# Patient Record
Sex: MALE | Race: OTHER | HISPANIC OR LATINO
[De-identification: names, ages, dates, MRNs, and addresses within clinical notes are randomized per-mention and may not be internally consistent; named-entity substitution may affect disease eponyms.]

---

## 2019-12-02 ENCOUNTER — TRANSCRIPTION ENCOUNTER (OUTPATIENT)
Age: 64
End: 2019-12-02

## 2023-05-16 PROBLEM — Z00.00 ENCOUNTER FOR PREVENTIVE HEALTH EXAMINATION: Status: ACTIVE | Noted: 2023-05-16

## 2023-05-18 ENCOUNTER — OUTPATIENT (OUTPATIENT)
Dept: OUTPATIENT SERVICES | Facility: HOSPITAL | Age: 68
LOS: 1 days | End: 2023-05-18

## 2023-05-18 DIAGNOSIS — Z00.8 ENCOUNTER FOR OTHER GENERAL EXAMINATION: ICD-10-CM

## 2023-05-22 ENCOUNTER — APPOINTMENT (OUTPATIENT)
Dept: OTOLARYNGOLOGY | Facility: CLINIC | Age: 68
End: 2023-05-22
Payer: MEDICARE

## 2023-05-22 VITALS
HEIGHT: 72 IN | HEART RATE: 67 BPM | WEIGHT: 210 LBS | RESPIRATION RATE: 16 BRPM | DIASTOLIC BLOOD PRESSURE: 107 MMHG | BODY MASS INDEX: 28.44 KG/M2 | OXYGEN SATURATION: 98 % | SYSTOLIC BLOOD PRESSURE: 198 MMHG

## 2023-05-22 PROCEDURE — 99024 POSTOP FOLLOW-UP VISIT: CPT

## 2023-05-22 PROCEDURE — 31575 DIAGNOSTIC LARYNGOSCOPY: CPT

## 2023-05-22 RX ORDER — AMLODIPINE BESYLATE 5 MG/1
TABLET ORAL
Refills: 0 | Status: ACTIVE | COMMUNITY

## 2023-05-22 NOTE — CONSULT LETTER
[Dear  ___] : Dear  [unfilled], [Consult Letter:] : I had the pleasure of evaluating your patient, [unfilled]. [Please see my note below.] : Please see my note below. [Consult Closing:] : Thank you very much for allowing me to participate in the care of this patient.  If you have any questions, please do not hesitate to contact me. [Sincerely,] : Sincerely, [FreeTextEntry2] : Dr. Ramesh Zhang\par [address] [facility], [address]\[place] [address] [FreeTextEntry3] : Dev

## 2023-05-22 NOTE — HISTORY OF PRESENT ILLNESS
[de-identified] : Pt is smoker and  is referred by Dr. Zhang for right vocal cord lesion. pt c.o hoarseness for one month. CT of neck on  5/5/2023 asymmetric thicken right true vocal cord fold.  pt c.o sudden on set of hoarsen about 7 weeks ago, severely hoarse in past 3 weeks.  no over use of voice, no URI. no dysphagia, mild coughing, no blood in sputum or saliva. no wt loss. \par \par smoker 2-3 cig.day recently, before 5-6/day for 50 yrs. \par no etoh\par  \par upload image here

## 2023-05-22 NOTE — PROCEDURE
[Lesion] : lesion identified by mirror examination needing further evaluation [None] : none [Flexible Endoscope] : examined with the flexible endoscope [Serial Number: ___] : Serial Number: [unfilled] [de-identified] : No lesions in the NPx, OPx, HPx.  Exam of the larynx with mass involving the R. VC which is paralyzed, L. VC is mobile, patient is intubatable.\par

## 2023-05-25 ENCOUNTER — OUTPATIENT (OUTPATIENT)
Dept: OUTPATIENT SERVICES | Facility: HOSPITAL | Age: 68
LOS: 1 days | End: 2023-05-25
Payer: MEDICARE

## 2023-05-25 VITALS
WEIGHT: 205.03 LBS | HEART RATE: 70 BPM | HEIGHT: 72 IN | OXYGEN SATURATION: 98 % | SYSTOLIC BLOOD PRESSURE: 140 MMHG | RESPIRATION RATE: 16 BRPM | DIASTOLIC BLOOD PRESSURE: 80 MMHG | TEMPERATURE: 98 F

## 2023-05-25 DIAGNOSIS — R49.0 DYSPHONIA: ICD-10-CM

## 2023-05-25 DIAGNOSIS — Z98.890 OTHER SPECIFIED POSTPROCEDURAL STATES: Chronic | ICD-10-CM

## 2023-05-25 DIAGNOSIS — I10 ESSENTIAL (PRIMARY) HYPERTENSION: ICD-10-CM

## 2023-05-25 LAB
ALBUMIN SERPL ELPH-MCNC: 4.5 G/DL — SIGNIFICANT CHANGE UP (ref 3.3–5)
ALP SERPL-CCNC: 108 U/L — SIGNIFICANT CHANGE UP (ref 40–120)
ALT FLD-CCNC: 30 U/L — SIGNIFICANT CHANGE UP (ref 4–41)
ANION GAP SERPL CALC-SCNC: 11 MMOL/L — SIGNIFICANT CHANGE UP (ref 7–14)
AST SERPL-CCNC: 27 U/L — SIGNIFICANT CHANGE UP (ref 4–40)
BILIRUB SERPL-MCNC: 0.3 MG/DL — SIGNIFICANT CHANGE UP (ref 0.2–1.2)
BUN SERPL-MCNC: 17 MG/DL — SIGNIFICANT CHANGE UP (ref 7–23)
CALCIUM SERPL-MCNC: 9.4 MG/DL — SIGNIFICANT CHANGE UP (ref 8.4–10.5)
CHLORIDE SERPL-SCNC: 104 MMOL/L — SIGNIFICANT CHANGE UP (ref 98–107)
CO2 SERPL-SCNC: 25 MMOL/L — SIGNIFICANT CHANGE UP (ref 22–31)
CREAT SERPL-MCNC: 1.16 MG/DL — SIGNIFICANT CHANGE UP (ref 0.5–1.3)
EGFR: 69 ML/MIN/1.73M2 — SIGNIFICANT CHANGE UP
GLUCOSE SERPL-MCNC: 80 MG/DL — SIGNIFICANT CHANGE UP (ref 70–99)
HCT VFR BLD CALC: 41.4 % — SIGNIFICANT CHANGE UP (ref 39–50)
HGB BLD-MCNC: 13.6 G/DL — SIGNIFICANT CHANGE UP (ref 13–17)
MCHC RBC-ENTMCNC: 30.8 PG — SIGNIFICANT CHANGE UP (ref 27–34)
MCHC RBC-ENTMCNC: 32.9 GM/DL — SIGNIFICANT CHANGE UP (ref 32–36)
MCV RBC AUTO: 93.9 FL — SIGNIFICANT CHANGE UP (ref 80–100)
NRBC # BLD: 0 /100 WBCS — SIGNIFICANT CHANGE UP (ref 0–0)
NRBC # FLD: 0 K/UL — SIGNIFICANT CHANGE UP (ref 0–0)
PLATELET # BLD AUTO: 300 K/UL — SIGNIFICANT CHANGE UP (ref 150–400)
POTASSIUM SERPL-MCNC: 4.2 MMOL/L — SIGNIFICANT CHANGE UP (ref 3.5–5.3)
POTASSIUM SERPL-SCNC: 4.2 MMOL/L — SIGNIFICANT CHANGE UP (ref 3.5–5.3)
PROT SERPL-MCNC: 7.5 G/DL — SIGNIFICANT CHANGE UP (ref 6–8.3)
RBC # BLD: 4.41 M/UL — SIGNIFICANT CHANGE UP (ref 4.2–5.8)
RBC # FLD: 12.6 % — SIGNIFICANT CHANGE UP (ref 10.3–14.5)
SODIUM SERPL-SCNC: 140 MMOL/L — SIGNIFICANT CHANGE UP (ref 135–145)
WBC # BLD: 6.72 K/UL — SIGNIFICANT CHANGE UP (ref 3.8–10.5)
WBC # FLD AUTO: 6.72 K/UL — SIGNIFICANT CHANGE UP (ref 3.8–10.5)

## 2023-05-25 PROCEDURE — 93010 ELECTROCARDIOGRAM REPORT: CPT

## 2023-05-25 RX ORDER — SODIUM CHLORIDE 9 MG/ML
1000 INJECTION, SOLUTION INTRAVENOUS
Refills: 0 | Status: DISCONTINUED | OUTPATIENT
Start: 2023-06-01 | End: 2023-06-16

## 2023-05-25 NOTE — H&P PST ADULT - HISTORY OF PRESENT ILLNESS
Pt is a 67 yr old male scheduled for Direct Laryngoscopy with Biopsy Esophagoscopy with Dr Correa tentatively 6/1/23 -  Pt is a 67 yr old male scheduled for Direct Laryngoscopy with Biopsy Esophagoscopy with Dr Correa tentatively 6/1/23 - Pt c/o of increasing hoarseness over past 2 months - pt is current smoker - pt denies pain , cough or SOB - Hx HTN

## 2023-05-25 NOTE — H&P PST ADULT - PROBLEM SELECTOR PLAN 1
Pt scheduled for surgery and preop instructions including instructions for taking Famotidine  the day of surgery, given verbally and with use of  written materials, and patient confirming understanding of such instructions using  teach back method.  Comp EKG requested from PCP

## 2023-05-31 ENCOUNTER — TRANSCRIPTION ENCOUNTER (OUTPATIENT)
Age: 68
End: 2023-05-31

## 2023-05-31 NOTE — ASU PATIENT PROFILE, ADULT - FALL HARM RISK - UNIVERSAL INTERVENTIONS
Bed in lowest position, wheels locked, appropriate side rails in place/Call bell, personal items and telephone in reach/Instruct patient to call for assistance before getting out of bed or chair/Non-slip footwear when patient is out of bed/[place] to call system/Physically safe environment - no spills, clutter or unnecessary equipment/Purposeful Proactive Rounding/Room/bathroom lighting operational, light cord in reach

## 2023-06-01 ENCOUNTER — TRANSCRIPTION ENCOUNTER (OUTPATIENT)
Age: 68
End: 2023-06-01

## 2023-06-01 ENCOUNTER — APPOINTMENT (OUTPATIENT)
Dept: OTOLARYNGOLOGY | Facility: HOSPITAL | Age: 68
End: 2023-06-01

## 2023-06-01 ENCOUNTER — RESULT REVIEW (OUTPATIENT)
Age: 68
End: 2023-06-01

## 2023-06-01 ENCOUNTER — OUTPATIENT (OUTPATIENT)
Dept: OUTPATIENT SERVICES | Facility: HOSPITAL | Age: 68
LOS: 1 days | Discharge: HOME | End: 2023-06-01
Payer: MEDICARE

## 2023-06-01 VITALS
OXYGEN SATURATION: 98 % | WEIGHT: 205.03 LBS | DIASTOLIC BLOOD PRESSURE: 80 MMHG | HEIGHT: 72 IN | SYSTOLIC BLOOD PRESSURE: 157 MMHG | TEMPERATURE: 98 F | RESPIRATION RATE: 14 BRPM | HEART RATE: 64 BPM

## 2023-06-01 VITALS
OXYGEN SATURATION: 97 % | DIASTOLIC BLOOD PRESSURE: 79 MMHG | HEART RATE: 57 BPM | SYSTOLIC BLOOD PRESSURE: 144 MMHG | RESPIRATION RATE: 13 BRPM

## 2023-06-01 DIAGNOSIS — R49.0 DYSPHONIA: ICD-10-CM

## 2023-06-01 DIAGNOSIS — Z98.890 OTHER SPECIFIED POSTPROCEDURAL STATES: Chronic | ICD-10-CM

## 2023-06-01 PROCEDURE — 31535 LARYNGOSCOPY W/BIOPSY: CPT | Mod: GC

## 2023-06-01 PROCEDURE — 43191 ESOPHAGOSCOPY RIGID TRNSO DX: CPT | Mod: GC

## 2023-06-01 PROCEDURE — 88305 TISSUE EXAM BY PATHOLOGIST: CPT | Mod: 26

## 2023-06-01 PROCEDURE — 88331 PATH CONSLTJ SURG 1 BLK 1SPC: CPT | Mod: 26

## 2023-06-01 RX ORDER — FENTANYL CITRATE 50 UG/ML
50 INJECTION INTRAVENOUS
Refills: 0 | Status: DISCONTINUED | OUTPATIENT
Start: 2023-06-01 | End: 2023-06-01

## 2023-06-01 RX ORDER — SODIUM CHLORIDE 9 MG/ML
1000 INJECTION, SOLUTION INTRAVENOUS
Refills: 0 | Status: DISCONTINUED | OUTPATIENT
Start: 2023-06-01 | End: 2023-06-16

## 2023-06-01 RX ORDER — ONDANSETRON 8 MG/1
4 TABLET, FILM COATED ORAL ONCE
Refills: 0 | Status: DISCONTINUED | OUTPATIENT
Start: 2023-06-01 | End: 2023-06-16

## 2023-06-01 RX ORDER — HYDROMORPHONE HYDROCHLORIDE 2 MG/ML
2 INJECTION INTRAMUSCULAR; INTRAVENOUS; SUBCUTANEOUS
Refills: 0 | Status: DISCONTINUED | OUTPATIENT
Start: 2023-06-01 | End: 2023-06-01

## 2023-06-01 NOTE — ASU DISCHARGE PLAN (ADULT/PEDIATRIC) - NS MD DC FALL RISK RISK
For information on Fall & Injury Prevention, visit: [email or web address] OR  [email or web address] OR  https://www.cdc.gov/steadi/patient.html

## 2023-06-01 NOTE — ASU DISCHARGE PLAN (ADULT/PEDIATRIC) - ASU DC SPECIAL INSTRUCTIONSFT
- Can resume normal diet. May want to start with a soft diet for the next day or so if swallowing if painful but can advance as tolerated   - No voice rest needed   - You may have some blood in your saliva. This is normal and expected  - If you start to experience severe difficulty breathing, shortness of breath, severe chest pain or any other concerning symptoms, please go to the emergency room   - Follow up with Dr. Correa as scheduled

## 2023-06-01 NOTE — BRIEF OPERATIVE NOTE - NSICDXBRIEFPROCEDURE_GEN_ALL_CORE_FT
PROCEDURES:  Laryngoscopy, direct, with neoplasm excision 01-Jun-2023 16:43:55  Marlon Baeza  Esophagoscopy, rigid 01-Jun-2023 16:44:07  Marlon Baeza

## 2023-06-01 NOTE — ASU DISCHARGE PLAN (ADULT/PEDIATRIC) - CARE PROVIDER_API CALL
Madeline Dev  Otolaryngology  [address] [place] [id number]  Phone: [phone]  Fax: [phone]  Follow Up Time:

## 2023-06-01 NOTE — ASU DISCHARGE PLAN (ADULT/PEDIATRIC) - NURSING INSTRUCTIONS
Last dose of TYLENOL for pain management was at 5:00 PM. Next dose of TYLENOL may be taken at or after 11:00 PM if needed. DO NOT take any additional products containing TYLENOL or ACETAMINOPHEN, such as VICODIN, PERCOCET, NORCO, EXCEDRIN, and any over-the-counter cold medications. DO NOT CONSUME MORE THAN 1474-4816 MG OF TYLENOL (acetaminophen) in a 24-hour period.

## 2023-06-02 PROBLEM — I10 ESSENTIAL (PRIMARY) HYPERTENSION: Chronic | Status: ACTIVE | Noted: 2023-05-25

## 2023-06-02 PROBLEM — Z87.891 PERSONAL HISTORY OF NICOTINE DEPENDENCE: Chronic | Status: ACTIVE | Noted: 2023-05-25

## 2023-06-02 PROBLEM — R49.0 DYSPHONIA: Chronic | Status: ACTIVE | Noted: 2023-05-25

## 2023-06-06 ENCOUNTER — APPOINTMENT (OUTPATIENT)
Dept: OTOLARYNGOLOGY | Facility: CLINIC | Age: 68
End: 2023-06-06
Payer: MEDICARE

## 2023-06-06 ENCOUNTER — NON-APPOINTMENT (OUTPATIENT)
Age: 68
End: 2023-06-06

## 2023-06-06 PROCEDURE — 31575 DIAGNOSTIC LARYNGOSCOPY: CPT

## 2023-06-06 PROCEDURE — 99214 OFFICE O/P EST MOD 30 MIN: CPT | Mod: 25

## 2023-06-06 NOTE — PROCEDURE
[None] : none [Flexible Endoscope] : examined with the flexible endoscope [Serial Number: ___] : Serial Number: [unfilled] [de-identified] : No lesions in the NPx, OPx, HPx. Exam of the larynx with mass involving the R. VC which is paralyzed, L. VC is mobile, patient is intubatable. [de-identified] : larynx SCCa

## 2023-06-06 NOTE — HISTORY OF PRESENT ILLNESS
[de-identified] : 68 year old male following up for vocal cord lesion. History of smoking 2-3 cig.day recently, before 5-6/day for 50 yrs. \par no etoh. s/p Direct laryngoscopy with biopsy with biopsy, esophagoscopy 6/1/23. official pathology pending. PET scan scheduled 6/13. pt tolerates procedure well. \par

## 2023-06-08 ENCOUNTER — OUTPATIENT (OUTPATIENT)
Dept: OUTPATIENT SERVICES | Facility: HOSPITAL | Age: 68
LOS: 1 days | Discharge: HOME | End: 2023-06-08

## 2023-06-08 DIAGNOSIS — Z98.890 OTHER SPECIFIED POSTPROCEDURAL STATES: Chronic | ICD-10-CM

## 2023-06-08 DIAGNOSIS — C32.0 MALIGNANT NEOPLASM OF GLOTTIS: ICD-10-CM

## 2023-06-08 LAB — SURGICAL PATHOLOGY STUDY: SIGNIFICANT CHANGE UP

## 2023-06-13 ENCOUNTER — APPOINTMENT (OUTPATIENT)
Dept: NUCLEAR MEDICINE | Facility: CLINIC | Age: 68
End: 2023-06-13
Payer: MEDICARE

## 2023-06-13 ENCOUNTER — OUTPATIENT (OUTPATIENT)
Dept: OUTPATIENT SERVICES | Facility: HOSPITAL | Age: 68
LOS: 1 days | End: 2023-06-13
Payer: MEDICARE

## 2023-06-13 ENCOUNTER — RESULT REVIEW (OUTPATIENT)
Age: 68
End: 2023-06-13

## 2023-06-13 ENCOUNTER — APPOINTMENT (OUTPATIENT)
Dept: MRI IMAGING | Facility: CLINIC | Age: 68
End: 2023-06-13
Payer: MEDICARE

## 2023-06-13 DIAGNOSIS — C32.9 MALIGNANT NEOPLASM OF LARYNX, UNSPECIFIED: ICD-10-CM

## 2023-06-13 DIAGNOSIS — Z98.890 OTHER SPECIFIED POSTPROCEDURAL STATES: Chronic | ICD-10-CM

## 2023-06-13 PROCEDURE — 78815 PET IMAGE W/CT SKULL-THIGH: CPT | Mod: 26,PI

## 2023-06-13 PROCEDURE — A9552: CPT

## 2023-06-13 PROCEDURE — 71045 X-RAY EXAM CHEST 1 VIEW: CPT | Mod: 26

## 2023-06-13 PROCEDURE — 78815 PET IMAGE W/CT SKULL-THIGH: CPT

## 2023-06-13 PROCEDURE — 71045 X-RAY EXAM CHEST 1 VIEW: CPT

## 2023-06-15 ENCOUNTER — APPOINTMENT (OUTPATIENT)
Dept: RADIATION ONCOLOGY | Facility: CLINIC | Age: 68
End: 2023-06-15
Payer: MEDICARE

## 2023-06-16 ENCOUNTER — APPOINTMENT (OUTPATIENT)
Dept: HEMATOLOGY ONCOLOGY | Facility: CLINIC | Age: 68
End: 2023-06-16

## 2023-06-16 ENCOUNTER — RESULT REVIEW (OUTPATIENT)
Age: 68
End: 2023-06-16

## 2023-06-16 ENCOUNTER — APPOINTMENT (OUTPATIENT)
Dept: HEMATOLOGY ONCOLOGY | Facility: CLINIC | Age: 68
End: 2023-06-16
Payer: MEDICARE

## 2023-06-16 VITALS
HEART RATE: 67 BPM | OXYGEN SATURATION: 96 % | TEMPERATURE: 97.7 F | DIASTOLIC BLOOD PRESSURE: 81 MMHG | SYSTOLIC BLOOD PRESSURE: 161 MMHG | HEIGHT: 72 IN | WEIGHT: 208 LBS | BODY MASS INDEX: 28.17 KG/M2

## 2023-06-16 LAB
BASOPHILS # BLD AUTO: 0.1 K/UL — SIGNIFICANT CHANGE UP (ref 0–0.2)
BASOPHILS NFR BLD AUTO: 0.7 % — SIGNIFICANT CHANGE UP (ref 0–2)
EOSINOPHIL # BLD AUTO: 0.2 K/UL — SIGNIFICANT CHANGE UP (ref 0–0.5)
EOSINOPHIL NFR BLD AUTO: 3.1 % — SIGNIFICANT CHANGE UP (ref 0–6)
HCT VFR BLD CALC: 40.8 % — SIGNIFICANT CHANGE UP (ref 39–50)
HGB BLD-MCNC: 14 G/DL — SIGNIFICANT CHANGE UP (ref 13–17)
LYMPHOCYTES # BLD AUTO: 1.2 K/UL — SIGNIFICANT CHANGE UP (ref 1–3.3)
LYMPHOCYTES # BLD AUTO: 16.8 % — SIGNIFICANT CHANGE UP (ref 13–44)
MCHC RBC-ENTMCNC: 32.6 PG — SIGNIFICANT CHANGE UP (ref 27–34)
MCHC RBC-ENTMCNC: 34.4 G/DL — SIGNIFICANT CHANGE UP (ref 32–36)
MCV RBC AUTO: 94.7 FL — SIGNIFICANT CHANGE UP (ref 80–100)
MONOCYTES # BLD AUTO: 0.7 K/UL — SIGNIFICANT CHANGE UP (ref 0–0.9)
MONOCYTES NFR BLD AUTO: 8.9 % — SIGNIFICANT CHANGE UP (ref 2–14)
NEUTROPHILS # BLD AUTO: 5.2 K/UL — SIGNIFICANT CHANGE UP (ref 1.8–7.4)
NEUTROPHILS NFR BLD AUTO: 70.4 % — SIGNIFICANT CHANGE UP (ref 43–77)
PLATELET # BLD AUTO: 260 K/UL — SIGNIFICANT CHANGE UP (ref 150–400)
RBC # BLD: 4.31 M/UL — SIGNIFICANT CHANGE UP (ref 4.2–5.8)
RBC # FLD: 12.4 % — SIGNIFICANT CHANGE UP (ref 10.3–14.5)
WBC # BLD: 7.3 K/UL — SIGNIFICANT CHANGE UP (ref 3.8–10.5)
WBC # FLD AUTO: 7.3 K/UL — SIGNIFICANT CHANGE UP (ref 3.8–10.5)

## 2023-06-16 PROCEDURE — 99205 OFFICE O/P NEW HI 60 MIN: CPT

## 2023-06-19 LAB
ALBUMIN SERPL ELPH-MCNC: 4.3 G/DL
ALP BLD-CCNC: 92 U/L
ALT SERPL-CCNC: 17 U/L
ANION GAP SERPL CALC-SCNC: 9 MMOL/L
AST SERPL-CCNC: 18 U/L
BILIRUB SERPL-MCNC: 0.3 MG/DL
BUN SERPL-MCNC: 18 MG/DL
CALCIUM SERPL-MCNC: 9.1 MG/DL
CHLORIDE SERPL-SCNC: 106 MMOL/L
CO2 SERPL-SCNC: 29 MMOL/L
CREAT SERPL-MCNC: 1.35 MG/DL
EGFR: 57 ML/MIN/1.73M2
GLUCOSE SERPL-MCNC: 99 MG/DL
MAGNESIUM SERPL-MCNC: 2.4 MG/DL
POTASSIUM SERPL-SCNC: 4.8 MMOL/L
PROT SERPL-MCNC: 6.8 G/DL
SODIUM SERPL-SCNC: 144 MMOL/L
TSH SERPL-ACNC: 1.55 UIU/ML

## 2023-06-20 ENCOUNTER — NON-APPOINTMENT (OUTPATIENT)
Age: 68
End: 2023-06-20

## 2023-06-21 ENCOUNTER — APPOINTMENT (OUTPATIENT)
Dept: OTOLARYNGOLOGY | Facility: CLINIC | Age: 68
End: 2023-06-21
Payer: MEDICARE

## 2023-06-21 ENCOUNTER — APPOINTMENT (OUTPATIENT)
Dept: RADIATION ONCOLOGY | Facility: CLINIC | Age: 68
End: 2023-06-21
Payer: MEDICARE

## 2023-06-21 ENCOUNTER — NON-APPOINTMENT (OUTPATIENT)
Age: 68
End: 2023-06-21

## 2023-06-21 ENCOUNTER — OUTPATIENT (OUTPATIENT)
Dept: OUTPATIENT SERVICES | Facility: HOSPITAL | Age: 68
LOS: 1 days | Discharge: HOME | End: 2023-06-21
Payer: MEDICARE

## 2023-06-21 VITALS
WEIGHT: 209 LBS | HEART RATE: 69 BPM | OXYGEN SATURATION: 99 % | BODY MASS INDEX: 28.35 KG/M2 | RESPIRATION RATE: 16 BRPM

## 2023-06-21 VITALS — BODY MASS INDEX: 28.17 KG/M2 | HEIGHT: 72 IN | WEIGHT: 208 LBS | TEMPERATURE: 97.8 F

## 2023-06-21 DIAGNOSIS — E78.5 HYPERLIPIDEMIA, UNSPECIFIED: ICD-10-CM

## 2023-06-21 DIAGNOSIS — H90.3 SENSORINEURAL HEARING LOSS, BILATERAL: ICD-10-CM

## 2023-06-21 DIAGNOSIS — Z86.79 PERSONAL HISTORY OF OTHER DISEASES OF THE CIRCULATORY SYSTEM: ICD-10-CM

## 2023-06-21 DIAGNOSIS — F17.200 NICOTINE DEPENDENCE, UNSPECIFIED, UNCOMPLICATED: ICD-10-CM

## 2023-06-21 DIAGNOSIS — Z98.890 OTHER SPECIFIED POSTPROCEDURAL STATES: Chronic | ICD-10-CM

## 2023-06-21 DIAGNOSIS — Z86.39 PERSONAL HISTORY OF OTHER ENDOCRINE, NUTRITIONAL AND METABOLIC DISEASE: ICD-10-CM

## 2023-06-21 DIAGNOSIS — I10 ESSENTIAL (PRIMARY) HYPERTENSION: ICD-10-CM

## 2023-06-21 PROCEDURE — 92567 TYMPANOMETRY: CPT

## 2023-06-21 PROCEDURE — 99204 OFFICE O/P NEW MOD 45 MIN: CPT | Mod: 25

## 2023-06-21 PROCEDURE — 99213 OFFICE O/P EST LOW 20 MIN: CPT

## 2023-06-21 PROCEDURE — 92557 COMPREHENSIVE HEARING TEST: CPT

## 2023-06-21 NOTE — REASON FOR VISIT
[Consideration of Curative Therapy] : consideration of curative therapy for [Head and Neck Cancer] : head and neck cancer [Spouse] : spouse [Family Member] : family member

## 2023-06-21 NOTE — HISTORY OF PRESENT ILLNESS
[de-identified] : Sched for baseline audio - sched for chemo and has hx larynx cancer - see Dr Correa.   Seen by Dr Correa 2 weeks ago. Did have laryngoscopy at that time.   t3N0Mx -Scc - right cord.  Patient does not want laryngoscopy at this time.

## 2023-06-21 NOTE — ASSESSMENT
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  Has hoarse voice.  Was seen by Dr Correa 2 weeks ago and does not want larynx eval today.  Exam otheriwse unremarkalbe - has bilat snhl mild/ mod - symmetric with A tymps.  Recommended repeat audio 2 mos or as recommended by chemo or if any change noted by patient

## 2023-06-21 NOTE — REASON FOR VISIT
[Subsequent Evaluation] : a subsequent evaluation for [FreeTextEntry2] : establish baseline prior to chemo

## 2023-06-22 PROBLEM — Z86.79 HISTORY OF ESSENTIAL HYPERTENSION: Status: RESOLVED | Noted: 2023-05-22 | Resolved: 2023-06-22

## 2023-06-22 PROBLEM — Z86.39 HISTORY OF HYPERCHOLESTEROLEMIA: Status: RESOLVED | Noted: 2023-05-22 | Resolved: 2023-06-22

## 2023-06-22 PROBLEM — F17.200 CURRENT SMOKER: Status: ACTIVE | Noted: 2023-05-22

## 2023-06-22 NOTE — REVIEW OF SYSTEMS
[Hoarseness] : hoarseness [Dysphagia] : no dysphagia [Loss of Hearing] : no loss of hearing [Nosebleeds] : no nosebleeds [Odynophagia] : no odynophagia [Mucosal Pain] : no mucosal pain [Negative] : Cardiovascular [FreeTextEntry4] : newly diagnosed vocal cord carcinoma [FreeTextEntry5] : hypertension, hyperlipidemia

## 2023-06-22 NOTE — LETTER CLOSING
[Consult Closing:] : Thank you for allowing me to participate in the care of this patient.  If you have any questions, please do not hesitate to contact me. [Sincerely yours,] : Sincerely yours, [FreeTextEntry3] : Pj Lovett MD\par Physician in Chief\par Department of Radiation Medicine\par [facility]\par [facility] [place]\par \par  of Radiation Medicine\par [facility]\par at  [facility]\par \par Radiation \par [facility]/\par [facility] [place]\par [address]\par [place], [place] [address]\par \par Tel: [phone]\par Fax: [phone]\par

## 2023-06-22 NOTE — DISEASE MANAGEMENT
[Clinical] : TNM Stage: c [IV] : IV [FreeTextEntry4] : squamous cell carcinoma [TTNM] : 4 [NTNM] : 2c [MTNM] : 0 [de-identified] : 2341 [de-identified] : larynx/necks

## 2023-06-22 NOTE — VITALS
[Maximal Pain Intensity: 1/10] : 1/10 [Least Pain Intensity: 0/10] : 0/10 [Pain Description/Quality: ___] : Pain description/quality: [unfilled] [Pain Duration: ___] : Pain duration: [unfilled] [Pain Location: ___] : Pain Location: [unfilled] [NoTreatment Scheduled] : no treatment scheduled [ECOG Performance Status: 1 - Restricted in physically strenuous activity but ambulatory and able to carry out work of a light or sedentary nature] : Performance Status: 1 - Restricted in physically strenuous activity but ambulatory and able to carry out work of a light or sedentary nature, e.g., light house work, office work [5 - Distress Level] : Distress Level: 5 [Referred Patient  to social work for follow-up] : Patient was referred to social work for follow-up [Pain Interferes with ADLs] : Pain does not interfere with activities of daily living [80: Normal activity with effort; some signs or symptoms of disease.] : 80: Normal activity with effort; some signs or symptoms of disease.

## 2023-06-22 NOTE — PROCEDURE
[Topical Lidocaine] : topical lidocaine [Flexible Endoscope] : examined with the flexible endoscope [Lesion(s)] : lesion(s) [Normal] : normal pharyngeal walls [Abnormal] : the true vocal cords ~T were abnormal [de-identified] : disposable [de-identified] : right aspect fullness [de-identified] : right

## 2023-06-22 NOTE — HISTORY OF PRESENT ILLNESS
[FreeTextEntry1] : Mr. Ureña presents today for consideration for radiation therapy accompanied by his wife Isamar and son Marv. \par \par He is a 68 year old male who was noted to have hoarseness for several  months.  CT of the neck done 5/5/2023 showed asymmetric thickening and medialization of the right true vocal fold relative to the left side.  No suspicious cervical adenopathy.\par \par 6/1/2023 he underwent a biopsy of a right vocal cord laryngeal lesion.  Pathology revealed squamous cell carcinoma, well differentiated.\par \par PET/CT 6/13/23\par hypermetabolic soft tissue mass centered on the anterior and anteromedial RIGHT true vocal cord with extension across the midline. \par Hypermetabolic nodes are present BILATERALLY. Reference examples include:\par Left level 2A (1.0 x 0.7 cm, SUV 3.7,)\par Left level IIb (0.6 x 0.7 cm, SUV 4.3,)\par Ill-defined focus posterolateral to RIGHT tongue base (SUV 4.6,)\par Right supraclavicular (0.9 x 0.7 cm, SUV 4.8, and 0.7 x 0.6 cm, SUV 4.6,)\par Ill-defined node at the RIGHT thoracic inlet inferior to the RIGHT lobe of the thyroid (SUV 6.9)\par \par IMPRESSION:\par 1. Hypermetabolic RIGHT laryngeal tumor with extension across the midline.\par 2. Findings suspicious for multiple BILATERAL lymph nodes with increased uptake where metastatic disease is not excluded. These are seen in the neck and neck bases.\par 3. Proximal gastric uptake, nonspecific. Correlate clinically.\par 4. Increased uptake at distal RIGHT colon, which may be physiologic or inflammatory.\par \par Mr. Ureña is planning weekly concurrent chemotherapy with radiation therapy at the direction of Dr. Morales.\par

## 2023-06-22 NOTE — LETTER GREETING
[Dear Doctor] : Dear Doctor, [Consult Letter:] : Your patient, [unfilled] was seen in my office today for consultation. [Please see my note below.] : Please see my note below. [FreeTextEntry2] : Edwin Correa MD

## 2023-06-22 NOTE — PHYSICAL EXAM
[Normal] : no joint swelling, no clubbing or cyanosis of the fingernails and muscle strength and tone were normal [de-identified] : fullness right glottic larynx

## 2023-06-26 ENCOUNTER — NON-APPOINTMENT (OUTPATIENT)
Age: 68
End: 2023-06-26

## 2023-06-26 PROCEDURE — 77263 THER RADIOLOGY TX PLNG CPLX: CPT

## 2023-06-27 PROCEDURE — 77470 SPECIAL RADIATION TREATMENT: CPT | Mod: 26

## 2023-06-29 DIAGNOSIS — C32.0 MALIGNANT NEOPLASM OF GLOTTIS: ICD-10-CM

## 2023-07-05 ENCOUNTER — OUTPATIENT (OUTPATIENT)
Dept: OUTPATIENT SERVICES | Facility: HOSPITAL | Age: 68
LOS: 1 days | End: 2023-07-05
Payer: MEDICARE

## 2023-07-05 ENCOUNTER — APPOINTMENT (OUTPATIENT)
Dept: MRI IMAGING | Facility: CLINIC | Age: 68
End: 2023-07-05

## 2023-07-05 ENCOUNTER — APPOINTMENT (OUTPATIENT)
Dept: MRI IMAGING | Facility: CLINIC | Age: 68
End: 2023-07-05
Payer: MEDICARE

## 2023-07-05 DIAGNOSIS — C32.9 MALIGNANT NEOPLASM OF LARYNX, UNSPECIFIED: ICD-10-CM

## 2023-07-05 DIAGNOSIS — Z98.890 OTHER SPECIFIED POSTPROCEDURAL STATES: Chronic | ICD-10-CM

## 2023-07-05 PROCEDURE — A9585: CPT

## 2023-07-05 PROCEDURE — 70543 MRI ORBT/FAC/NCK W/O &W/DYE: CPT | Mod: 26

## 2023-07-05 PROCEDURE — 70543 MRI ORBT/FAC/NCK W/O &W/DYE: CPT

## 2023-07-10 ENCOUNTER — APPOINTMENT (OUTPATIENT)
Dept: HEMATOLOGY ONCOLOGY | Facility: CLINIC | Age: 68
End: 2023-07-10
Payer: MEDICARE

## 2023-07-10 ENCOUNTER — RESULT REVIEW (OUTPATIENT)
Age: 68
End: 2023-07-10

## 2023-07-10 VITALS
HEART RATE: 67 BPM | WEIGHT: 205.25 LBS | DIASTOLIC BLOOD PRESSURE: 76 MMHG | BODY MASS INDEX: 27.8 KG/M2 | HEIGHT: 72 IN | OXYGEN SATURATION: 96 % | SYSTOLIC BLOOD PRESSURE: 148 MMHG

## 2023-07-10 LAB
BASOPHILS # BLD AUTO: 0.1 K/UL — SIGNIFICANT CHANGE UP (ref 0–0.2)
BASOPHILS NFR BLD AUTO: 1.3 % — SIGNIFICANT CHANGE UP (ref 0–2)
EOSINOPHIL # BLD AUTO: 0.2 K/UL — SIGNIFICANT CHANGE UP (ref 0–0.5)
EOSINOPHIL NFR BLD AUTO: 3.1 % — SIGNIFICANT CHANGE UP (ref 0–6)
HCT VFR BLD CALC: 40 % — SIGNIFICANT CHANGE UP (ref 39–50)
HGB BLD-MCNC: 13.6 G/DL — SIGNIFICANT CHANGE UP (ref 13–17)
LYMPHOCYTES # BLD AUTO: 1.2 K/UL — SIGNIFICANT CHANGE UP (ref 1–3.3)
LYMPHOCYTES # BLD AUTO: 19.2 % — SIGNIFICANT CHANGE UP (ref 13–44)
MCHC RBC-ENTMCNC: 32 PG — SIGNIFICANT CHANGE UP (ref 27–34)
MCHC RBC-ENTMCNC: 34.1 G/DL — SIGNIFICANT CHANGE UP (ref 32–36)
MCV RBC AUTO: 93.9 FL — SIGNIFICANT CHANGE UP (ref 80–100)
MONOCYTES # BLD AUTO: 0.5 K/UL — SIGNIFICANT CHANGE UP (ref 0–0.9)
MONOCYTES NFR BLD AUTO: 8.6 % — SIGNIFICANT CHANGE UP (ref 2–14)
NEUTROPHILS # BLD AUTO: 4.3 K/UL — SIGNIFICANT CHANGE UP (ref 1.8–7.4)
NEUTROPHILS NFR BLD AUTO: 67.8 % — SIGNIFICANT CHANGE UP (ref 43–77)
PLATELET # BLD AUTO: 286 K/UL — SIGNIFICANT CHANGE UP (ref 150–400)
RBC # BLD: 4.26 M/UL — SIGNIFICANT CHANGE UP (ref 4.2–5.8)
RBC # FLD: 12.2 % — SIGNIFICANT CHANGE UP (ref 10.3–14.5)
WBC # BLD: 6.3 K/UL — SIGNIFICANT CHANGE UP (ref 3.8–10.5)
WBC # FLD AUTO: 6.3 K/UL — SIGNIFICANT CHANGE UP (ref 3.8–10.5)

## 2023-07-10 PROCEDURE — 99214 OFFICE O/P EST MOD 30 MIN: CPT

## 2023-07-12 LAB
ALBUMIN SERPL ELPH-MCNC: 4.4 G/DL
ALP BLD-CCNC: 101 U/L
ALT SERPL-CCNC: 18 U/L
ANION GAP SERPL CALC-SCNC: 15 MMOL/L
AST SERPL-CCNC: 18 U/L
BILIRUB SERPL-MCNC: 0.2 MG/DL
BUN SERPL-MCNC: 19 MG/DL
CALCIUM SERPL-MCNC: 9.3 MG/DL
CHLORIDE SERPL-SCNC: 103 MMOL/L
CO2 SERPL-SCNC: 23 MMOL/L
CREAT SERPL-MCNC: 1.25 MG/DL
EGFR: 63 ML/MIN/1.73M2
GLUCOSE SERPL-MCNC: 99 MG/DL
POTASSIUM SERPL-SCNC: 4.8 MMOL/L
PROT SERPL-MCNC: 6.9 G/DL
SODIUM SERPL-SCNC: 141 MMOL/L

## 2023-07-13 PROCEDURE — 77338 DESIGN MLC DEVICE FOR IMRT: CPT | Mod: 26

## 2023-07-13 PROCEDURE — 77301 RADIOTHERAPY DOSE PLAN IMRT: CPT | Mod: 26

## 2023-07-13 PROCEDURE — 77300 RADIATION THERAPY DOSE PLAN: CPT | Mod: 26

## 2023-07-17 NOTE — ASSESSMENT
[FreeTextEntry1] : LISSET RUBALCAVA is a 68 year M with PMHX of HTN?, HLD?, current smoker (2-3 cig a day recently, before 5-6/day for 50 yrs)  presents for initial consultation for Advanced/very advanced head and neck carcinoma possibly T4 at least N2 Squamous cell carcinoma of larynx. \par \par Patient evaluated by Dr. Correa on 5/22/23. Laryngoscopy: No lesions in the NPx, OPx, HPx. Exam of the larynx with mass involving the R. VC which is paralyzed, L. VC is mobile, patient is intuitable.\par \par -Will plan for concurrent chemo RT discussed with patient weekly Cisplatin. Side effects of cisplatin include but are not limited to Renal toxicity/ acute renal failure, peripheral neuropathy, ototoxicity, myelosuppression/ nausea/ vomiting, fatigue. Creatinine as of 5/20/23 at 1.16. \par -Discussed supportive care such as antinausea and anticonstipation medications. Also advised patient to obtain thermometer to monitor for fevers. Will to monitor CBC & CMP.  \par -Baseline hearing test done on 6/21/23\par -Dental clearance\par -Advised screening colonoscopy and endoscopy\par -Patient scheduled to start RT 7/21/23 and start weekly Cisplatin on 7/24/23\par - F/U in 1-2 weeks after 1st treatment \par

## 2023-07-17 NOTE — RESULTS/DATA
[FreeTextEntry1] : 6/13/23 PEt/CT \par There is a hypermetabolic soft tissue mass centered on the anterior and anteromedial RIGHT true vocal cord with extension across the midline (SUV 21.4, image 132, dedicated head and neck series). Adenoids, tonsils, nasopharynx, oropharynx, hypopharynx, remaining trachea without suspicious uptake, anatomic effacement or mass, or other anatomic abnormality. Tongue without suspicious findings.\par \par Hypermetabolic nodes are present BILATERALLY. Reference examples include:\par \par Left level 2A (1.0 x 0.7 cm, SUV 3.7, image 103)\par Left level IIb (0.6 x 0.7 cm, SUV 4.3, image 111)\par Ill-defined focus posterolateral to RIGHT tongue base (SUV 4.6, image 105)\par Right supraclavicular (0.9 x 0.7 cm, SUV 4.8, and 0.7 x 0.6 cm, SUV 4.6, image 149)\par Ill-defined node at the RIGHT thoracic inlet inferior to the RIGHT lobe of the thyroid (SUV 6.9, image 159)\par \par \par \par IMPRESSION:\par 1.  Hypermetabolic RIGHT laryngeal tumor with extension across the midline.\par 2.  Findings suspicious for multiple BILATERAL lymph nodes with increased uptake where metastatic disease is not excluded. These are seen in the neck and neck bases.\par 3.  Proximal gastric uptake, nonspecific. Correlate clinically.\par 4.  Increased uptake at distal RIGHT colon, which may be physiologic or inflammatory.\par \par \par 6/2/2023 Surgical Pathology\par \par Final Diagnosis\par \par 1. Larynx, right vocal cord lesion, biopsy\par - Squamous cell carcinoma, well differentiated\par \par 5/5/23 Ct Neck \par \par asymmetric thickening and medialization of the right true vocal fold relative to the left side. No suspicious cervical adenopathy.

## 2023-07-17 NOTE — HISTORY OF PRESENT ILLNESS
[de-identified] : LISSET RUBALCAVA is a 68 year M with PMHX of HTN?, HLD?, current smoker (2-3 cig a day recently, before 5-6/day for 50 yrs)  presents for initial consultation for laryngeal cancer.\par \par Patient was noted to have hoarseness for a month. A CT neck done 5/5/2023 showed asymmetric thickening and medialization of the right true vocal fold relative to the left side. No suspicious cervical adenopathy.\par \par Patient evaluated by Dr. Correa on 5/22/23. Laryngoscopy: No lesions in the NPx, OPx, HPx. Exam of the larynx with mass involving the R. VC which is paralyzed, L. VC is mobile, patient is intubatable.\par \par On 6/1/2023,  he underwent a biopsy of a right vocal cord laryngeal lesion. This revealed squamous cell carcinoma, well differentiated.\par \par PEt/Ct on 6/13 reports a hypermetabolic soft tissue mass centered on the anterior and anteromedial RIGHT true vocal cord with extension across the midline . \par Hypermetabolic nodes are present BILATERALLY. Reference examples include:\par Left level 2A (1.0 x 0.7 cm, SUV 3.7,)\par Left level IIb (0.6 x 0.7 cm, SUV 4.3,)\par Ill-defined focus posterolateral to RIGHT tongue base (SUV 4.6,)\par Right supraclavicular (0.9 x 0.7 cm, SUV 4.8, and 0.7 x 0.6 cm, SUV 4.6,)\par Ill-defined node at the RIGHT thoracic inlet inferior to the RIGHT lobe of the thyroid (SUV 6.9)\par \par IMPRESSION:\par 1.  Hypermetabolic RIGHT laryngeal tumor with extension across the midline.\par 2.  Findings suspicious for multiple BILATERAL lymph nodes with increased uptake where metastatic disease is not excluded. These are seen in the neck and neck bases.\par 3.  Proximal gastric uptake, nonspecific. Correlate clinically.\par 4.  Increased uptake at distal RIGHT colon, which may be physiologic or inflammatory.\par \par \par \par \par  [de-identified] : Patient presents for follow up with wife Isamar and son Marv\par \par Patient scheduled to start RT 7/21/23 and start weekly Cisplatin on 7/24/23\par \par Reports 4 months ago he started to notice change in voice.\par Denies hearing trouble, GERD, h/o thyroid abnormalities,  trouble swallowing\par Reports last colonoscopy was 14 years ago.\par Patient admits he is fully independent and otherwise healthy. \par Patient is still smoking 1-2 cigs a day \par \par He is retired. \par \par 7/5/23 MR Orbit \par \par IMPRESSION:  Motion degraded exam.\par \par Diffuse thickening and enhancement involving the right vocal cord compatible with the patient's known history of squamous cell carcinoma which crosses midline at the anterior commissure. 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

## 2023-07-21 ENCOUNTER — APPOINTMENT (OUTPATIENT)
Dept: HEMATOLOGY ONCOLOGY | Facility: CLINIC | Age: 68
End: 2023-07-21

## 2023-07-24 ENCOUNTER — RESULT REVIEW (OUTPATIENT)
Age: 68
End: 2023-07-24

## 2023-07-24 ENCOUNTER — APPOINTMENT (OUTPATIENT)
Age: 68
End: 2023-07-24

## 2023-07-24 ENCOUNTER — NON-APPOINTMENT (OUTPATIENT)
Age: 68
End: 2023-07-24

## 2023-07-24 VITALS
HEART RATE: 61 BPM | DIASTOLIC BLOOD PRESSURE: 79 MMHG | SYSTOLIC BLOOD PRESSURE: 154 MMHG | RESPIRATION RATE: 16 BRPM | WEIGHT: 204 LBS | OXYGEN SATURATION: 97 % | BODY MASS INDEX: 27.67 KG/M2

## 2023-07-24 LAB
ALBUMIN SERPL ELPH-MCNC: 4.1 G/DL — SIGNIFICANT CHANGE UP (ref 3.3–5)
ALP SERPL-CCNC: 93 U/L — SIGNIFICANT CHANGE UP (ref 40–120)
ALT FLD-CCNC: 16 U/L — SIGNIFICANT CHANGE UP (ref 10–45)
ANION GAP SERPL CALC-SCNC: 12 MMOL/L — SIGNIFICANT CHANGE UP (ref 5–17)
AST SERPL-CCNC: 19 U/L — SIGNIFICANT CHANGE UP (ref 10–40)
BASOPHILS # BLD AUTO: 0.1 K/UL — SIGNIFICANT CHANGE UP (ref 0–0.2)
BASOPHILS NFR BLD AUTO: 1.2 % — SIGNIFICANT CHANGE UP (ref 0–2)
BILIRUB SERPL-MCNC: 0.4 MG/DL — SIGNIFICANT CHANGE UP (ref 0.2–1.2)
BUN SERPL-MCNC: 14 MG/DL — SIGNIFICANT CHANGE UP (ref 7–23)
CALCIUM SERPL-MCNC: 8.9 MG/DL — SIGNIFICANT CHANGE UP (ref 8.4–10.5)
CHLORIDE SERPL-SCNC: 105 MMOL/L — SIGNIFICANT CHANGE UP (ref 96–108)
CO2 SERPL-SCNC: 22 MMOL/L — SIGNIFICANT CHANGE UP (ref 22–31)
CREAT SERPL-MCNC: 1.07 MG/DL — SIGNIFICANT CHANGE UP (ref 0.5–1.3)
EGFR: 76 ML/MIN/1.73M2 — SIGNIFICANT CHANGE UP
EOSINOPHIL # BLD AUTO: 0.3 K/UL — SIGNIFICANT CHANGE UP (ref 0–0.5)
EOSINOPHIL NFR BLD AUTO: 4.2 % — SIGNIFICANT CHANGE UP (ref 0–6)
GLUCOSE SERPL-MCNC: 94 MG/DL — SIGNIFICANT CHANGE UP (ref 70–99)
HBV CORE AB SER-ACNC: REACTIVE
HBV SURFACE AB SER-ACNC: REACTIVE
HBV SURFACE AG SER-ACNC: SIGNIFICANT CHANGE UP
HCT VFR BLD CALC: 40.3 % — SIGNIFICANT CHANGE UP (ref 39–50)
HCV AB S/CO SERPL IA: 0.07 S/CO — SIGNIFICANT CHANGE UP (ref 0–0.99)
HCV AB SERPL-IMP: SIGNIFICANT CHANGE UP
HGB BLD-MCNC: 13.4 G/DL — SIGNIFICANT CHANGE UP (ref 13–17)
LYMPHOCYTES # BLD AUTO: 1.1 K/UL — SIGNIFICANT CHANGE UP (ref 1–3.3)
LYMPHOCYTES # BLD AUTO: 17 % — SIGNIFICANT CHANGE UP (ref 13–44)
MAGNESIUM SERPL-MCNC: 2.2 MG/DL — SIGNIFICANT CHANGE UP (ref 1.6–2.6)
MCHC RBC-ENTMCNC: 31.5 PG — SIGNIFICANT CHANGE UP (ref 27–34)
MCHC RBC-ENTMCNC: 33.4 G/DL — SIGNIFICANT CHANGE UP (ref 32–36)
MCV RBC AUTO: 94.4 FL — SIGNIFICANT CHANGE UP (ref 80–100)
MONOCYTES # BLD AUTO: 0.7 K/UL — SIGNIFICANT CHANGE UP (ref 0–0.9)
MONOCYTES NFR BLD AUTO: 10.4 % — SIGNIFICANT CHANGE UP (ref 2–14)
NEUTROPHILS # BLD AUTO: 4.4 K/UL — SIGNIFICANT CHANGE UP (ref 1.8–7.4)
NEUTROPHILS NFR BLD AUTO: 67.1 % — SIGNIFICANT CHANGE UP (ref 43–77)
PLATELET # BLD AUTO: 279 K/UL — SIGNIFICANT CHANGE UP (ref 150–400)
POTASSIUM SERPL-MCNC: 4.1 MMOL/L — SIGNIFICANT CHANGE UP (ref 3.5–5.3)
POTASSIUM SERPL-SCNC: 4.1 MMOL/L — SIGNIFICANT CHANGE UP (ref 3.5–5.3)
PROT SERPL-MCNC: 6.8 G/DL — SIGNIFICANT CHANGE UP (ref 6–8.3)
RBC # BLD: 4.27 M/UL — SIGNIFICANT CHANGE UP (ref 4.2–5.8)
RBC # FLD: 11.8 % — SIGNIFICANT CHANGE UP (ref 10.3–14.5)
SODIUM SERPL-SCNC: 139 MMOL/L — SIGNIFICANT CHANGE UP (ref 135–145)
WBC # BLD: 6.6 K/UL — SIGNIFICANT CHANGE UP (ref 3.8–10.5)
WBC # FLD AUTO: 6.6 K/UL — SIGNIFICANT CHANGE UP (ref 3.8–10.5)

## 2023-07-24 PROCEDURE — 77387B: CUSTOM | Mod: 26

## 2023-07-25 ENCOUNTER — APPOINTMENT (OUTPATIENT)
Age: 68
End: 2023-07-25

## 2023-07-25 DIAGNOSIS — E86.0 DEHYDRATION: ICD-10-CM

## 2023-07-25 DIAGNOSIS — R11.2 NAUSEA WITH VOMITING, UNSPECIFIED: ICD-10-CM

## 2023-07-25 DIAGNOSIS — Z51.11 ENCOUNTER FOR ANTINEOPLASTIC CHEMOTHERAPY: ICD-10-CM

## 2023-07-25 PROCEDURE — 77387B: CUSTOM | Mod: 26

## 2023-07-26 PROCEDURE — 77387B: CUSTOM | Mod: 26

## 2023-07-27 PROCEDURE — 77387B: CUSTOM | Mod: 26

## 2023-07-28 ENCOUNTER — APPOINTMENT (OUTPATIENT)
Dept: HEMATOLOGY ONCOLOGY | Facility: CLINIC | Age: 68
End: 2023-07-28

## 2023-07-28 ENCOUNTER — RESULT REVIEW (OUTPATIENT)
Age: 68
End: 2023-07-28

## 2023-07-28 LAB
BASOPHILS # BLD AUTO: 0.1 K/UL — SIGNIFICANT CHANGE UP (ref 0–0.2)
BASOPHILS NFR BLD AUTO: 0.9 % — SIGNIFICANT CHANGE UP (ref 0–2)
EOSINOPHIL # BLD AUTO: 0.2 K/UL — SIGNIFICANT CHANGE UP (ref 0–0.5)
EOSINOPHIL NFR BLD AUTO: 2.4 % — SIGNIFICANT CHANGE UP (ref 0–6)
HCT VFR BLD CALC: 39.8 % — SIGNIFICANT CHANGE UP (ref 39–50)
HGB BLD-MCNC: 13.2 G/DL — SIGNIFICANT CHANGE UP (ref 13–17)
LYMPHOCYTES # BLD AUTO: 0.9 K/UL — LOW (ref 1–3.3)
LYMPHOCYTES # BLD AUTO: 13.1 % — SIGNIFICANT CHANGE UP (ref 13–44)
MCHC RBC-ENTMCNC: 31.4 PG — SIGNIFICANT CHANGE UP (ref 27–34)
MCHC RBC-ENTMCNC: 33.2 G/DL — SIGNIFICANT CHANGE UP (ref 32–36)
MCV RBC AUTO: 94.5 FL — SIGNIFICANT CHANGE UP (ref 80–100)
MONOCYTES # BLD AUTO: 0.6 K/UL — SIGNIFICANT CHANGE UP (ref 0–0.9)
MONOCYTES NFR BLD AUTO: 9.1 % — SIGNIFICANT CHANGE UP (ref 2–14)
NEUTROPHILS # BLD AUTO: 5.3 K/UL — SIGNIFICANT CHANGE UP (ref 1.8–7.4)
NEUTROPHILS NFR BLD AUTO: 74.5 % — SIGNIFICANT CHANGE UP (ref 43–77)
PLATELET # BLD AUTO: 270 K/UL — SIGNIFICANT CHANGE UP (ref 150–400)
RBC # BLD: 4.21 M/UL — SIGNIFICANT CHANGE UP (ref 4.2–5.8)
RBC # FLD: 11.4 % — SIGNIFICANT CHANGE UP (ref 10.3–14.5)
WBC # BLD: 7.1 K/UL — SIGNIFICANT CHANGE UP (ref 3.8–10.5)
WBC # FLD AUTO: 7.1 K/UL — SIGNIFICANT CHANGE UP (ref 3.8–10.5)

## 2023-07-28 PROCEDURE — 77014: CPT | Mod: 26

## 2023-07-28 PROCEDURE — 77427 RADIATION TX MANAGEMENT X5: CPT

## 2023-07-31 ENCOUNTER — RESULT REVIEW (OUTPATIENT)
Age: 68
End: 2023-07-31

## 2023-07-31 ENCOUNTER — APPOINTMENT (OUTPATIENT)
Dept: HEMATOLOGY ONCOLOGY | Facility: CLINIC | Age: 68
End: 2023-07-31
Payer: MEDICARE

## 2023-07-31 ENCOUNTER — NON-APPOINTMENT (OUTPATIENT)
Age: 68
End: 2023-07-31

## 2023-07-31 ENCOUNTER — APPOINTMENT (OUTPATIENT)
Age: 68
End: 2023-07-31

## 2023-07-31 VITALS
SYSTOLIC BLOOD PRESSURE: 144 MMHG | DIASTOLIC BLOOD PRESSURE: 70 MMHG | WEIGHT: 201 LBS | OXYGEN SATURATION: 97 % | BODY MASS INDEX: 27.26 KG/M2 | RESPIRATION RATE: 16 BRPM | HEART RATE: 70 BPM

## 2023-07-31 LAB
ALBUMIN SERPL ELPH-MCNC: 4.1 G/DL — SIGNIFICANT CHANGE UP (ref 3.3–5)
ALBUMIN SERPL ELPH-MCNC: 4.3 G/DL
ALP BLD-CCNC: 100 U/L
ALP SERPL-CCNC: 96 U/L — SIGNIFICANT CHANGE UP (ref 40–120)
ALT FLD-CCNC: 28 U/L — SIGNIFICANT CHANGE UP (ref 10–45)
ALT SERPL-CCNC: 25 U/L
ANION GAP SERPL CALC-SCNC: 11 MMOL/L — SIGNIFICANT CHANGE UP (ref 5–17)
ANION GAP SERPL CALC-SCNC: 13 MMOL/L
AST SERPL-CCNC: 20 U/L
AST SERPL-CCNC: 26 U/L — SIGNIFICANT CHANGE UP (ref 10–40)
BILIRUB SERPL-MCNC: 0.4 MG/DL — SIGNIFICANT CHANGE UP (ref 0.2–1.2)
BILIRUB SERPL-MCNC: 0.6 MG/DL
BUN SERPL-MCNC: 21 MG/DL
BUN SERPL-MCNC: 38 MG/DL — HIGH (ref 7–23)
CALCIUM SERPL-MCNC: 9.4 MG/DL — SIGNIFICANT CHANGE UP (ref 8.4–10.5)
CALCIUM SERPL-MCNC: 9.5 MG/DL
CHLORIDE SERPL-SCNC: 100 MMOL/L
CHLORIDE SERPL-SCNC: 101 MMOL/L — SIGNIFICANT CHANGE UP (ref 96–108)
CO2 SERPL-SCNC: 27 MMOL/L — SIGNIFICANT CHANGE UP (ref 22–31)
CO2 SERPL-SCNC: 28 MMOL/L
CREAT SERPL-MCNC: 1.13 MG/DL
CREAT SERPL-MCNC: 2 MG/DL — HIGH (ref 0.5–1.3)
EGFR: 36 ML/MIN/1.73M2 — LOW
EGFR: 71 ML/MIN/1.73M2
GLUCOSE SERPL-MCNC: 123 MG/DL
GLUCOSE SERPL-MCNC: 93 MG/DL — SIGNIFICANT CHANGE UP (ref 70–99)
MAGNESIUM SERPL-MCNC: 2.1 MG/DL
MAGNESIUM SERPL-MCNC: 2.3 MG/DL — SIGNIFICANT CHANGE UP (ref 1.6–2.6)
POTASSIUM SERPL-MCNC: 4 MMOL/L — SIGNIFICANT CHANGE UP (ref 3.5–5.3)
POTASSIUM SERPL-SCNC: 3.8 MMOL/L
POTASSIUM SERPL-SCNC: 4 MMOL/L — SIGNIFICANT CHANGE UP (ref 3.5–5.3)
PROT SERPL-MCNC: 6.7 G/DL
PROT SERPL-MCNC: 7.1 G/DL — SIGNIFICANT CHANGE UP (ref 6–8.3)
SODIUM SERPL-SCNC: 139 MMOL/L — SIGNIFICANT CHANGE UP (ref 135–145)
SODIUM SERPL-SCNC: 141 MMOL/L

## 2023-07-31 PROCEDURE — 77387B: CUSTOM | Mod: 26

## 2023-07-31 PROCEDURE — 99214 OFFICE O/P EST MOD 30 MIN: CPT

## 2023-08-01 ENCOUNTER — APPOINTMENT (OUTPATIENT)
Age: 68
End: 2023-08-01

## 2023-08-01 PROCEDURE — 77387B: CUSTOM | Mod: 26

## 2023-08-02 PROCEDURE — 77387B: CUSTOM | Mod: 26

## 2023-08-03 PROCEDURE — 77387B: CUSTOM | Mod: 26

## 2023-08-03 NOTE — ASSESSMENT
[FreeTextEntry1] : LISSET RUBALCAVA is a 68 year M with PMHX of HTN?, HLD?, current smoker (2-3 cig a day recently, before 5-6/day for 50 yrs)  presents for initial consultation for Advanced/very advanced head and neck carcinoma possibly T4 at least N2 Squamous cell carcinoma of larynx.   Patient evaluated by Dr. Correa on 5/22/23. Laryngoscopy: No lesions in the NPx, OPx, HPx. Exam of the larynx with mass involving the R. VC which is paralyzed, L. VC is mobile, patient is intuitable.  -Will plan for concurrent chemo RT discussed with patient weekly Cisplatin. Side effects of cisplatin include but are not limited to Renal toxicity/ acute renal failure, peripheral neuropathy, ototoxicity, myelosuppression/ nausea/ vomiting, fatigue. Creatinine as of 5/20/23 at 1.16.  -Discussed supportive care such as antinausea and anticonstipation medications. Also advised patient to obtain thermometer to monitor for fevers. Will to monitor CBC & CMP.   -Baseline hearing test done on 6/21/23 -Dental clearance -Advised screening colonoscopy and endoscopy -Patient scheduled to start RT 7/21/23 and start weekly Cisplatin on 7/24/23 - C2 given today  - F/U in 2-3 weeks

## 2023-08-03 NOTE — HISTORY OF PRESENT ILLNESS
[de-identified] : LISSET RUBALCAVA is a 68 year M with PMHX of HTN?, HLD?, current smoker (2-3 cig a day recently, before 5-6/day for 50 yrs)  presents for initial consultation for laryngeal cancer.\par  \par  Patient was noted to have hoarseness for a month. A CT neck done 5/5/2023 showed asymmetric thickening and medialization of the right true vocal fold relative to the left side. No suspicious cervical adenopathy.\par  \par  Patient evaluated by Dr. Correa on 5/22/23. Laryngoscopy: No lesions in the NPx, OPx, HPx. Exam of the larynx with mass involving the R. VC which is paralyzed, L. VC is mobile, patient is intubatable.\par  \par  On 6/1/2023,  he underwent a biopsy of a right vocal cord laryngeal lesion. This revealed squamous cell carcinoma, well differentiated.\par  \par  PEt/Ct on 6/13 reports a hypermetabolic soft tissue mass centered on the anterior and anteromedial RIGHT true vocal cord with extension across the midline . \par  Hypermetabolic nodes are present BILATERALLY. Reference examples include:\par  Left level 2A (1.0 x 0.7 cm, SUV 3.7,)\par  Left level IIb (0.6 x 0.7 cm, SUV 4.3,)\par  Ill-defined focus posterolateral to RIGHT tongue base (SUV 4.6,)\par  Right supraclavicular (0.9 x 0.7 cm, SUV 4.8, and 0.7 x 0.6 cm, SUV 4.6,)\par  Ill-defined node at the RIGHT thoracic inlet inferior to the RIGHT lobe of the thyroid (SUV 6.9)\par  \par  IMPRESSION:\par  1.  Hypermetabolic RIGHT laryngeal tumor with extension across the midline.\par  2.  Findings suspicious for multiple BILATERAL lymph nodes with increased uptake where metastatic disease is not excluded. These are seen in the neck and neck bases.\par  3.  Proximal gastric uptake, nonspecific. Correlate clinically.\par  4.  Increased uptake at distal RIGHT colon, which may be physiologic or inflammatory.\par  \par  \par  \par  \par   [de-identified] : Patient presents for follow up with wife Isamar and son Marv  Patient scheduled to start RT 7/21/23 and start weekly Cisplatin on 7/24/23 Patient to recieve C2 today  Patient admits fatigue  Reports Constipation, using  MiraLAX with relief  Denies fever/chills, fatigue,rash, mouth sores, nausea, vomiting, diarrhea, abdominal pain, bleeding, easy bruising or visual changes, chest pain, cough, SOB or MACEDO,  neuropathy, LE edema. Denies hearing trouble, GERD, h/o thyroid abnormalities,  trouble swallowing Reports last colonoscopy was 14 years ago. Patient is still smoking 1-2 cigs a day   He is retired.   7/5/23 MR Orbit   IMPRESSION:  Motion degraded exam.  Diffuse thickening and enhancement involving the right vocal cord compatible with the patient's known history of squamous cell carcinoma which crosses midline at the anterior commissure. Mild subglottic extension.  No evidence of extralaryngeal tumor extension.  No cervical adenopathy identified.  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

## 2023-08-04 ENCOUNTER — APPOINTMENT (OUTPATIENT)
Age: 68
End: 2023-08-04

## 2023-08-04 ENCOUNTER — RESULT REVIEW (OUTPATIENT)
Age: 68
End: 2023-08-04

## 2023-08-04 ENCOUNTER — APPOINTMENT (OUTPATIENT)
Dept: HEMATOLOGY ONCOLOGY | Facility: CLINIC | Age: 68
End: 2023-08-04

## 2023-08-04 LAB
ALBUMIN SERPL ELPH-MCNC: 4 G/DL — SIGNIFICANT CHANGE UP (ref 3.3–5)
ALP SERPL-CCNC: 100 U/L — SIGNIFICANT CHANGE UP (ref 40–120)
ALT FLD-CCNC: 34 U/L — SIGNIFICANT CHANGE UP (ref 10–45)
ANION GAP SERPL CALC-SCNC: 13 MMOL/L — SIGNIFICANT CHANGE UP (ref 5–17)
AST SERPL-CCNC: 26 U/L — SIGNIFICANT CHANGE UP (ref 10–40)
BILIRUB SERPL-MCNC: 0.4 MG/DL — SIGNIFICANT CHANGE UP (ref 0.2–1.2)
BUN SERPL-MCNC: 35 MG/DL — HIGH (ref 7–23)
CALCIUM SERPL-MCNC: 9.2 MG/DL — SIGNIFICANT CHANGE UP (ref 8.4–10.5)
CHLORIDE SERPL-SCNC: 99 MMOL/L — SIGNIFICANT CHANGE UP (ref 96–108)
CO2 SERPL-SCNC: 26 MMOL/L — SIGNIFICANT CHANGE UP (ref 22–31)
CREAT SERPL-MCNC: 1.45 MG/DL — HIGH (ref 0.5–1.3)
EGFR: 52 ML/MIN/1.73M2 — LOW
GLUCOSE SERPL-MCNC: 66 MG/DL — LOW (ref 70–99)
POTASSIUM SERPL-MCNC: 4.3 MMOL/L — SIGNIFICANT CHANGE UP (ref 3.5–5.3)
POTASSIUM SERPL-SCNC: 4.3 MMOL/L — SIGNIFICANT CHANGE UP (ref 3.5–5.3)
PROT SERPL-MCNC: 7.2 G/DL — SIGNIFICANT CHANGE UP (ref 6–8.3)
SODIUM SERPL-SCNC: 138 MMOL/L — SIGNIFICANT CHANGE UP (ref 135–145)

## 2023-08-04 PROCEDURE — 77427 RADIATION TX MANAGEMENT X5: CPT

## 2023-08-04 PROCEDURE — 77014: CPT | Mod: 26

## 2023-08-07 ENCOUNTER — APPOINTMENT (OUTPATIENT)
Age: 68
End: 2023-08-07

## 2023-08-07 ENCOUNTER — RESULT REVIEW (OUTPATIENT)
Age: 68
End: 2023-08-07

## 2023-08-07 LAB
BASOPHILS # BLD AUTO: 0.1 K/UL — SIGNIFICANT CHANGE UP (ref 0–0.2)
BASOPHILS NFR BLD AUTO: 1.3 % — SIGNIFICANT CHANGE UP (ref 0–2)
EOSINOPHIL # BLD AUTO: 0.2 K/UL — SIGNIFICANT CHANGE UP (ref 0–0.5)
EOSINOPHIL NFR BLD AUTO: 2.5 % — SIGNIFICANT CHANGE UP (ref 0–6)
HCT VFR BLD CALC: 36.2 % — LOW (ref 39–50)
HGB BLD-MCNC: 13 G/DL — SIGNIFICANT CHANGE UP (ref 13–17)
LYMPHOCYTES # BLD AUTO: 0.7 K/UL — LOW (ref 1–3.3)
LYMPHOCYTES # BLD AUTO: 8.4 % — LOW (ref 13–44)
MCHC RBC-ENTMCNC: 32.6 PG — SIGNIFICANT CHANGE UP (ref 27–34)
MCHC RBC-ENTMCNC: 35.8 G/DL — SIGNIFICANT CHANGE UP (ref 32–36)
MCV RBC AUTO: 91 FL — SIGNIFICANT CHANGE UP (ref 80–100)
MONOCYTES # BLD AUTO: 0.8 K/UL — SIGNIFICANT CHANGE UP (ref 0–0.9)
MONOCYTES NFR BLD AUTO: 10.5 % — SIGNIFICANT CHANGE UP (ref 2–14)
NEUTROPHILS # BLD AUTO: 6 K/UL — SIGNIFICANT CHANGE UP (ref 1.8–7.4)
NEUTROPHILS NFR BLD AUTO: 77.3 % — HIGH (ref 43–77)
PLATELET # BLD AUTO: 218 K/UL — SIGNIFICANT CHANGE UP (ref 150–400)
RBC # BLD: 3.98 M/UL — LOW (ref 4.2–5.8)
RBC # FLD: 11 % — SIGNIFICANT CHANGE UP (ref 10.3–14.5)
WBC # BLD: 7.8 K/UL — SIGNIFICANT CHANGE UP (ref 3.8–10.5)
WBC # FLD AUTO: 7.8 K/UL — SIGNIFICANT CHANGE UP (ref 3.8–10.5)

## 2023-08-07 PROCEDURE — 77387B: CUSTOM | Mod: 26

## 2023-08-08 ENCOUNTER — NON-APPOINTMENT (OUTPATIENT)
Age: 68
End: 2023-08-08

## 2023-08-08 ENCOUNTER — APPOINTMENT (OUTPATIENT)
Age: 68
End: 2023-08-08

## 2023-08-08 ENCOUNTER — OUTPATIENT (OUTPATIENT)
Dept: OUTPATIENT SERVICES | Facility: HOSPITAL | Age: 68
LOS: 1 days | Discharge: HOME | End: 2023-08-08

## 2023-08-08 VITALS
WEIGHT: 204 LBS | DIASTOLIC BLOOD PRESSURE: 78 MMHG | OXYGEN SATURATION: 98 % | HEART RATE: 69 BPM | RESPIRATION RATE: 16 BRPM | BODY MASS INDEX: 27.67 KG/M2 | SYSTOLIC BLOOD PRESSURE: 180 MMHG

## 2023-08-08 DIAGNOSIS — C32.0 MALIGNANT NEOPLASM OF GLOTTIS: ICD-10-CM

## 2023-08-08 DIAGNOSIS — Z98.890 OTHER SPECIFIED POSTPROCEDURAL STATES: Chronic | ICD-10-CM

## 2023-08-08 LAB
ALBUMIN SERPL ELPH-MCNC: 4.1 G/DL — SIGNIFICANT CHANGE UP (ref 3.3–5)
ALP SERPL-CCNC: 101 U/L — SIGNIFICANT CHANGE UP (ref 40–120)
ALT FLD-CCNC: 22 U/L — SIGNIFICANT CHANGE UP (ref 10–45)
ANION GAP SERPL CALC-SCNC: 14 MMOL/L — SIGNIFICANT CHANGE UP (ref 5–17)
AST SERPL-CCNC: 21 U/L — SIGNIFICANT CHANGE UP (ref 10–40)
BILIRUB SERPL-MCNC: 0.3 MG/DL — SIGNIFICANT CHANGE UP (ref 0.2–1.2)
BUN SERPL-MCNC: 33 MG/DL — HIGH (ref 7–23)
CALCIUM SERPL-MCNC: 9.1 MG/DL — SIGNIFICANT CHANGE UP (ref 8.4–10.5)
CHLORIDE SERPL-SCNC: 99 MMOL/L — SIGNIFICANT CHANGE UP (ref 96–108)
CO2 SERPL-SCNC: 25 MMOL/L — SIGNIFICANT CHANGE UP (ref 22–31)
CREAT SERPL-MCNC: 1.6 MG/DL — HIGH (ref 0.5–1.3)
EGFR: 47 ML/MIN/1.73M2 — LOW
GLUCOSE SERPL-MCNC: 85 MG/DL — SIGNIFICANT CHANGE UP (ref 70–99)
MAGNESIUM SERPL-MCNC: 2.4 MG/DL — SIGNIFICANT CHANGE UP (ref 1.6–2.6)
POTASSIUM SERPL-MCNC: 4.3 MMOL/L — SIGNIFICANT CHANGE UP (ref 3.5–5.3)
POTASSIUM SERPL-SCNC: 4.3 MMOL/L — SIGNIFICANT CHANGE UP (ref 3.5–5.3)
PROT SERPL-MCNC: 6.9 G/DL — SIGNIFICANT CHANGE UP (ref 6–8.3)
SODIUM SERPL-SCNC: 138 MMOL/L — SIGNIFICANT CHANGE UP (ref 135–145)

## 2023-08-08 PROCEDURE — 77387B: CUSTOM | Mod: 26

## 2023-08-08 NOTE — ADDENDUM
[FreeTextEntry1] : Documented by Danyell Belle acting as scribe for Dr. Morales on 06/16/2023 \par \par All Medical record entries made by the Scribe were at my, Dr. Morales's, direction and personally dictated by me on 06/16/2023 . I have reviewed the chart and agree that the record accurately reflects my personal performance of the history, physical exam, assessment and plan. I have also personally directed, reviewed, and agreed with the discharge instructions.\par

## 2023-08-08 NOTE — DISEASE MANAGEMENT
[Clinical] : TNM Stage: c [FreeTextEntry4] : squamous cell carcinoma [TTNM] : 2 [NTNM] : 2 [MTNM] : 0 [IV] : IV [de-identified] : 200 cGy [de-identified] : 7000 cGy [de-identified] : glottis

## 2023-08-08 NOTE — HISTORY OF PRESENT ILLNESS
[de-identified] : LISSET RUBALCAVA is a 68 year M with PMHX of HTN?, HLD?, current smoker (2-3 cig a day recently, before 5-6/day for 50 yrs)  presents for initial consultation for laryngeal cancer.\par \par Patient was noted to have hoarseness for a month. A CT neck done 5/5/2023 showed asymmetric thickening and medialization of the right true vocal fold relative to the left side. No suspicious cervical adenopathy.\par \par Patient evaluated by Dr. Correa on 5/22/23. Laryngoscopy: No lesions in the NPx, OPx, HPx. Exam of the larynx with mass involving the R. VC which is paralyzed, L. VC is mobile, patient is intubatable.\par \par On 6/1/2023,  he underwent a biopsy of a right vocal cord laryngeal lesion. This revealed squamous cell carcinoma, well differentiated.\par \par PEt/Ct on 6/13 reports a hypermetabolic soft tissue mass centered on the anterior and anteromedial RIGHT true vocal cord with extension across the midline . \par Hypermetabolic nodes are present BILATERALLY. Reference examples include:\par Left level 2A (1.0 x 0.7 cm, SUV 3.7,)\par Left level IIb (0.6 x 0.7 cm, SUV 4.3,)\par Ill-defined focus posterolateral to RIGHT tongue base (SUV 4.6,)\par Right supraclavicular (0.9 x 0.7 cm, SUV 4.8, and 0.7 x 0.6 cm, SUV 4.6,)\par Ill-defined node at the RIGHT thoracic inlet inferior to the RIGHT lobe of the thyroid (SUV 6.9)\par \par IMPRESSION:\par 1.  Hypermetabolic RIGHT laryngeal tumor with extension across the midline.\par 2.  Findings suspicious for multiple BILATERAL lymph nodes with increased uptake where metastatic disease is not excluded. These are seen in the neck and neck bases.\par 3.  Proximal gastric uptake, nonspecific. Correlate clinically.\par 4.  Increased uptake at distal RIGHT colon, which may be physiologic or inflammatory.\par \par \par \par \par  [de-identified] : Patient presents for an initial consultation with wife Isamar and son Marv\par \par Appointment with Dr. Lovett on 6/15/23 rescheduled 2/2 to pending MRI\par Reports claustrophobia with MRI machines admits he needs sedating.\par Reports 4 months ago he started to notice change in voice.\par Denies hearing trouble.\par Denies GERD\par Denies h/o thyroid abnormalities\par Denies trouble swallowing\par Reports last colonoscopy was 14 years ago.\par Patient admits he is fully independent and otherwise healthy. \par Patient is still smoking 1-2 cigs a day \par \par \par He is retired.

## 2023-08-08 NOTE — VITALS
[Maximal Pain Intensity: 5/10] : 5/10 [Least Pain Intensity: 0/10] : 0/10 [Pain Description/Quality: ___] : Pain description/quality: [unfilled] [Pain Duration: ___] : Pain duration: [unfilled] [Pain Location: ___] : Pain Location: [unfilled] [80: Normal activity with effort; some signs or symptoms of disease.] : 80: Normal activity with effort; some signs or symptoms of disease.  [80: Active, but tires more quickly] : 80: Active, but tires more quickly [Pain Interferes with ADLs] : Pain does not interfere with activities of daily living

## 2023-08-08 NOTE — DISEASE MANAGEMENT
[Clinical] : TNM Stage: c [FreeTextEntry4] : squamous cell carcinoma [TTNM] : 2 [NTNM] : 2 [MTNM] : 0 [de-identified] : 2400cGy [de-identified] : 7000 cGy [de-identified] : glottis

## 2023-08-08 NOTE — ASSESSMENT
[FreeTextEntry1] : LISSET RUBALCAVA is a 68 year M with PMHX of HTN?, HLD?, current smoker (2-3 cig a day recently, before 5-6/day for 50 yrs)  presents for initial consultation for Advanced/very advanced head and neck carcinoma possibly T4 at least N2 Squamous cell carcinoma of larynx.   Patient evaluated by Dr. Correa on 5/22/23. Laryngoscopy: No lesions in the NPx, OPx, HPx. Exam of the larynx with mass involving the R. VC which is paralyzed, L. VC is mobile, patient is intuitable.  -Will plan for concurrent chemo RT discussed with patient weekly Cisplatin. Side effects of cisplatin include but are not limited to Renal toxicity/ acute renal failure, peripheral neuropathy, ototoxicity, myelosuppression/ nausea/ vomiting, fatigue. Creatinine as of 5/20/23 at 1.16.  -Discussed supportive care such as antinausea and anticonstipation medications. Also advised patient to obtain thermometer to monitor for fevers. Will to monitor CBC & CMP.   -Will need baseline cbc, cmp, TFTs -Schedule baseline hearing test.  -Dental clearance -Will coordinate with rad-onc, Dr. Lovett -MRI larynx pending -Chemo consent signed  -Advised screening colonoscopy and endoscopy -F/u with rad-onc on 6/27/23 -F/u in 2 weeks with Priscila

## 2023-08-09 DIAGNOSIS — E86.0 DEHYDRATION: ICD-10-CM

## 2023-08-09 PROCEDURE — 77387B: CUSTOM | Mod: 26

## 2023-08-10 ENCOUNTER — RESULT REVIEW (OUTPATIENT)
Age: 68
End: 2023-08-10

## 2023-08-10 ENCOUNTER — APPOINTMENT (OUTPATIENT)
Dept: HEMATOLOGY ONCOLOGY | Facility: CLINIC | Age: 68
End: 2023-08-10

## 2023-08-10 LAB
BASOPHILS # BLD AUTO: 0.1 K/UL — SIGNIFICANT CHANGE UP (ref 0–0.2)
BASOPHILS NFR BLD AUTO: 1.2 % — SIGNIFICANT CHANGE UP (ref 0–2)
EOSINOPHIL # BLD AUTO: 0.2 K/UL — SIGNIFICANT CHANGE UP (ref 0–0.5)
EOSINOPHIL NFR BLD AUTO: 2.7 % — SIGNIFICANT CHANGE UP (ref 0–6)
HCT VFR BLD CALC: 37.3 % — LOW (ref 39–50)
HGB BLD-MCNC: 12.8 G/DL — LOW (ref 13–17)
LYMPHOCYTES # BLD AUTO: 0.4 K/UL — LOW (ref 1–3.3)
LYMPHOCYTES # BLD AUTO: 7.6 % — LOW (ref 13–44)
MCHC RBC-ENTMCNC: 32 PG — SIGNIFICANT CHANGE UP (ref 27–34)
MCHC RBC-ENTMCNC: 34.4 G/DL — SIGNIFICANT CHANGE UP (ref 32–36)
MCV RBC AUTO: 93 FL — SIGNIFICANT CHANGE UP (ref 80–100)
MONOCYTES # BLD AUTO: 0.6 K/UL — SIGNIFICANT CHANGE UP (ref 0–0.9)
MONOCYTES NFR BLD AUTO: 10.8 % — SIGNIFICANT CHANGE UP (ref 2–14)
NEUTROPHILS # BLD AUTO: 4.4 K/UL — SIGNIFICANT CHANGE UP (ref 1.8–7.4)
NEUTROPHILS NFR BLD AUTO: 77.7 % — HIGH (ref 43–77)
PLATELET # BLD AUTO: 247 K/UL — SIGNIFICANT CHANGE UP (ref 150–400)
RBC # BLD: 4.01 M/UL — LOW (ref 4.2–5.8)
RBC # FLD: 11.6 % — SIGNIFICANT CHANGE UP (ref 10.3–14.5)
WBC # BLD: 5.7 K/UL — SIGNIFICANT CHANGE UP (ref 3.8–10.5)
WBC # FLD AUTO: 5.7 K/UL — SIGNIFICANT CHANGE UP (ref 3.8–10.5)

## 2023-08-10 PROCEDURE — 77387B: CUSTOM | Mod: 26

## 2023-08-14 ENCOUNTER — RESULT REVIEW (OUTPATIENT)
Age: 68
End: 2023-08-14

## 2023-08-14 ENCOUNTER — APPOINTMENT (OUTPATIENT)
Dept: OTOLARYNGOLOGY | Facility: CLINIC | Age: 68
End: 2023-08-14
Payer: MEDICARE

## 2023-08-14 ENCOUNTER — APPOINTMENT (OUTPATIENT)
Dept: HEMATOLOGY ONCOLOGY | Facility: CLINIC | Age: 68
End: 2023-08-14
Payer: MEDICARE

## 2023-08-14 ENCOUNTER — APPOINTMENT (OUTPATIENT)
Dept: OTOLARYNGOLOGY | Facility: CLINIC | Age: 68
End: 2023-08-14

## 2023-08-14 ENCOUNTER — NON-APPOINTMENT (OUTPATIENT)
Age: 68
End: 2023-08-14

## 2023-08-14 ENCOUNTER — APPOINTMENT (OUTPATIENT)
Age: 68
End: 2023-08-14

## 2023-08-14 VITALS
WEIGHT: 194 LBS | HEART RATE: 70 BPM | DIASTOLIC BLOOD PRESSURE: 73 MMHG | BODY MASS INDEX: 26.31 KG/M2 | OXYGEN SATURATION: 98 % | RESPIRATION RATE: 16 BRPM | SYSTOLIC BLOOD PRESSURE: 123 MMHG | TEMPERATURE: 98 F

## 2023-08-14 VITALS
OXYGEN SATURATION: 98 % | HEIGHT: 72 IN | DIASTOLIC BLOOD PRESSURE: 78 MMHG | WEIGHT: 196.32 LBS | TEMPERATURE: 98.9 F | BODY MASS INDEX: 26.59 KG/M2 | SYSTOLIC BLOOD PRESSURE: 133 MMHG | HEART RATE: 62 BPM

## 2023-08-14 DIAGNOSIS — E83.42 HYPOMAGNESEMIA: ICD-10-CM

## 2023-08-14 LAB
ALBUMIN SERPL ELPH-MCNC: 4.1 G/DL
ALBUMIN SERPL ELPH-MCNC: 4.1 G/DL — SIGNIFICANT CHANGE UP (ref 3.3–5)
ALP BLD-CCNC: 102 U/L
ALP SERPL-CCNC: 105 U/L — SIGNIFICANT CHANGE UP (ref 40–120)
ALT FLD-CCNC: 24 U/L — SIGNIFICANT CHANGE UP (ref 10–45)
ALT SERPL-CCNC: 23 U/L
ANION GAP SERPL CALC-SCNC: 11 MMOL/L
ANION GAP SERPL CALC-SCNC: 14 MMOL/L — SIGNIFICANT CHANGE UP (ref 5–17)
AST SERPL-CCNC: 16 U/L
AST SERPL-CCNC: 25 U/L — SIGNIFICANT CHANGE UP (ref 10–40)
BASOPHILS # BLD AUTO: 0.1 K/UL — SIGNIFICANT CHANGE UP (ref 0–0.2)
BASOPHILS NFR BLD AUTO: 1.3 % — SIGNIFICANT CHANGE UP (ref 0–2)
BILIRUB SERPL-MCNC: 0.3 MG/DL — SIGNIFICANT CHANGE UP (ref 0.2–1.2)
BILIRUB SERPL-MCNC: 0.4 MG/DL
BUN SERPL-MCNC: 35 MG/DL
BUN SERPL-MCNC: 38 MG/DL — HIGH (ref 7–23)
CALCIUM SERPL-MCNC: 9.1 MG/DL
CALCIUM SERPL-MCNC: 9.3 MG/DL — SIGNIFICANT CHANGE UP (ref 8.4–10.5)
CHLORIDE SERPL-SCNC: 97 MMOL/L — SIGNIFICANT CHANGE UP (ref 96–108)
CHLORIDE SERPL-SCNC: 99 MMOL/L
CO2 SERPL-SCNC: 24 MMOL/L — SIGNIFICANT CHANGE UP (ref 22–31)
CO2 SERPL-SCNC: 30 MMOL/L
CREAT SERPL-MCNC: 1.53 MG/DL — HIGH (ref 0.5–1.3)
CREAT SERPL-MCNC: 1.72 MG/DL
EGFR: 43 ML/MIN/1.73M2
EGFR: 49 ML/MIN/1.73M2 — LOW
EOSINOPHIL # BLD AUTO: 0.2 K/UL — SIGNIFICANT CHANGE UP (ref 0–0.5)
EOSINOPHIL NFR BLD AUTO: 3.1 % — SIGNIFICANT CHANGE UP (ref 0–6)
GLUCOSE SERPL-MCNC: 164 MG/DL
GLUCOSE SERPL-MCNC: 92 MG/DL — SIGNIFICANT CHANGE UP (ref 70–99)
HCT VFR BLD CALC: 33.4 % — LOW (ref 39–50)
HGB BLD-MCNC: 11.8 G/DL — LOW (ref 13–17)
LYMPHOCYTES # BLD AUTO: 0.5 K/UL — LOW (ref 1–3.3)
LYMPHOCYTES # BLD AUTO: 7.4 % — LOW (ref 13–44)
MAGNESIUM SERPL-MCNC: 1.9 MG/DL
MAGNESIUM SERPL-MCNC: 2.2 MG/DL — SIGNIFICANT CHANGE UP (ref 1.6–2.6)
MCHC RBC-ENTMCNC: 32.2 PG — SIGNIFICANT CHANGE UP (ref 27–34)
MCHC RBC-ENTMCNC: 35.3 G/DL — SIGNIFICANT CHANGE UP (ref 32–36)
MCV RBC AUTO: 91.2 FL — SIGNIFICANT CHANGE UP (ref 80–100)
MONOCYTES # BLD AUTO: 0.8 K/UL — SIGNIFICANT CHANGE UP (ref 0–0.9)
MONOCYTES NFR BLD AUTO: 12.8 % — SIGNIFICANT CHANGE UP (ref 2–14)
NEUTROPHILS # BLD AUTO: 4.8 K/UL — SIGNIFICANT CHANGE UP (ref 1.8–7.4)
NEUTROPHILS NFR BLD AUTO: 75.4 % — SIGNIFICANT CHANGE UP (ref 43–77)
PLATELET # BLD AUTO: 187 K/UL — SIGNIFICANT CHANGE UP (ref 150–400)
POTASSIUM SERPL-MCNC: 4.3 MMOL/L — SIGNIFICANT CHANGE UP (ref 3.5–5.3)
POTASSIUM SERPL-SCNC: 4.3 MMOL/L — SIGNIFICANT CHANGE UP (ref 3.5–5.3)
POTASSIUM SERPL-SCNC: 4.4 MMOL/L
PROT SERPL-MCNC: 6.5 G/DL
PROT SERPL-MCNC: 7.4 G/DL — SIGNIFICANT CHANGE UP (ref 6–8.3)
RBC # BLD: 3.66 M/UL — LOW (ref 4.2–5.8)
RBC # FLD: 11.2 % — SIGNIFICANT CHANGE UP (ref 10.3–14.5)
SODIUM SERPL-SCNC: 136 MMOL/L — SIGNIFICANT CHANGE UP (ref 135–145)
SODIUM SERPL-SCNC: 141 MMOL/L
WBC # BLD: 6.3 K/UL — SIGNIFICANT CHANGE UP (ref 3.8–10.5)
WBC # FLD AUTO: 6.3 K/UL — SIGNIFICANT CHANGE UP (ref 3.8–10.5)

## 2023-08-14 PROCEDURE — 31575 DIAGNOSTIC LARYNGOSCOPY: CPT

## 2023-08-14 PROCEDURE — 99215 OFFICE O/P EST HI 40 MIN: CPT

## 2023-08-14 PROCEDURE — 99214 OFFICE O/P EST MOD 30 MIN: CPT | Mod: 25

## 2023-08-14 PROCEDURE — 77427 RADIATION TX MANAGEMENT X5: CPT

## 2023-08-14 PROCEDURE — 77387B: CUSTOM | Mod: 26

## 2023-08-14 NOTE — CONSULT LETTER
[Dear  ___] : Dear  [unfilled], [Consult Letter:] : I had the pleasure of evaluating your patient, [unfilled]. [Please see my note below.] : Please see my note below. [Consult Closing:] : Thank you very much for allowing me to participate in the care of this patient.  If you have any questions, please do not hesitate to contact me. [Sincerely,] : Sincerely, [FreeTextEntry3] : Katherine Morales MD [facility] Hematologist/ Medical Oncologist [address] [place] [address]  [phone](Ph)  [phone] (fax)

## 2023-08-14 NOTE — PROCEDURE
[None] : none [Flexible Endoscope] : examined with the flexible endoscope [Serial Number: ___] : Serial Number: [unfilled] [de-identified] : No lesions in the NPx, OPx, HPx. Exam of the larynx with mass involving the R. VC which is paralyzed - looks improved, L. VC is mobile, patient is intubatable.  Moderate mucositis. [de-identified] : larynx SCCa

## 2023-08-14 NOTE — DISEASE MANAGEMENT
[Clinical] : TNM Stage: c [IV] : IV [FreeTextEntry4] : squamous cell carcinoma [TTNM] : 2 [NTNM] : 2 [MTNM] : 0 [de-identified] : 9300cGy [de-identified] : 7000 cGy [de-identified] : glottis

## 2023-08-14 NOTE — HISTORY OF PRESENT ILLNESS
[de-identified] : LISSET RUBALCAVA is a 68 year M with PMHX of HTN?, HLD?, current smoker (2-3 cig a day recently, before 5-6/day for 50 yrs)  presents for initial consultation for laryngeal cancer.\par  \par  Patient was noted to have hoarseness for a month. A CT neck done 5/5/2023 showed asymmetric thickening and medialization of the right true vocal fold relative to the left side. No suspicious cervical adenopathy.\par  \par  Patient evaluated by Dr. Correa on 5/22/23. Laryngoscopy: No lesions in the NPx, OPx, HPx. Exam of the larynx with mass involving the R. VC which is paralyzed, L. VC is mobile, patient is intubatable.\par  \par  On 6/1/2023,  he underwent a biopsy of a right vocal cord laryngeal lesion. This revealed squamous cell carcinoma, well differentiated.\par  \par  PEt/Ct on 6/13 reports a hypermetabolic soft tissue mass centered on the anterior and anteromedial RIGHT true vocal cord with extension across the midline . \par  Hypermetabolic nodes are present BILATERALLY. Reference examples include:\par  Left level 2A (1.0 x 0.7 cm, SUV 3.7,)\par  Left level IIb (0.6 x 0.7 cm, SUV 4.3,)\par  Ill-defined focus posterolateral to RIGHT tongue base (SUV 4.6,)\par  Right supraclavicular (0.9 x 0.7 cm, SUV 4.8, and 0.7 x 0.6 cm, SUV 4.6,)\par  Ill-defined node at the RIGHT thoracic inlet inferior to the RIGHT lobe of the thyroid (SUV 6.9)\par  \par  IMPRESSION:\par  1.  Hypermetabolic RIGHT laryngeal tumor with extension across the midline.\par  2.  Findings suspicious for multiple BILATERAL lymph nodes with increased uptake where metastatic disease is not excluded. These are seen in the neck and neck bases.\par  3.  Proximal gastric uptake, nonspecific. Correlate clinically.\par  4.  Increased uptake at distal RIGHT colon, which may be physiologic or inflammatory.\par  \par  \par  \par  \par   [de-identified] : Patient presents for follow up with son Marv  Patient scheduled to start RT 7/21/23 and start weekly Cisplatin on 7/24/23 Patient to recieve C4 today  Patient reports poor appetite due to taste. He is drinking 4 ensures a day Reports 1 episode of his throat burning, resolved on own Notes last day of RT is scheduled for 9/12/23 Denies fever/chills, fatigue,rash, mouth sores, nausea, vomiting, diarrhea, abdominal pain, bleeding, easy bruising or visual changes, chest pain, cough, SOB or MACEDO,  neuropathy, LE edema. Reports recent colonoscopy, 1 polyp removal everything normal per patient.   Patient is still smoking 1-2 cigs a day   He is retired.   7/5/23 MR Orbit   IMPRESSION:  Motion degraded exam.  Diffuse thickening and enhancement involving the right vocal cord compatible with the patient's known history of squamous cell carcinoma which crosses midline at the anterior commissure. Mild subglottic extension.  No evidence of extralaryngeal tumor extension.  No cervical adenopathy identified.  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

## 2023-08-14 NOTE — PHYSICAL EXAM
[Normal] : no rashes [Laryngoscopy Performed] : laryngoscopy was performed, see procedure section for findings [FreeTextEntry1] : No concerning lesions in the OC/OPx on inspection or palpation.  Mild/moderate mucositis.

## 2023-08-14 NOTE — HISTORY OF PRESENT ILLNESS
[de-identified] : 68 year old male following up for vocal cord lesion. History of smoking 2-3 cig.day recently, before 5-6/day for 50 yrs.  no etoh. s/p Direct laryngoscopy with biopsy with biopsy, esophagoscopy 6/1/23 showed Squamous cell carcinoma, well differentiated. Pt is on chemoRT with Dr. Lovett and Dr. Morales, started RT on 7/21/23 and weekly Cisplatin on 7/24/23 and last day of RT is scheduled for 9/12/23. Pt is here f/u mid treatment f/u dry mouth and alternate taste, but keep up PO.  mild pain, some wt loss 15lbs since tx.  pt is drinking ensure and hydrating well.

## 2023-08-14 NOTE — ADDENDUM
[FreeTextEntry1] : Documented by Danyell Belle acting as scribe for Dr. Morales on 08/14/2023   All Medical record entries made by the Scribe were at my, Dr. Morales's, direction and personally dictated by me on 08/14/2023 . I have reviewed the chart and agree that the record accurately reflects my personal performance of the history, physical exam, assessment and plan. I have also personally directed, reviewed, and agreed with the discharge instructions.

## 2023-08-14 NOTE — ASSESSMENT
[FreeTextEntry1] : LISSET RUBALCAVA is a 68 year M with PMHX of HTN?, HLD?, current smoker (2-3 cig a day recently, before 5-6/day for 50 yrs)  presents for initial consultation for Advanced/very advanced head and neck carcinoma possibly T4 at least N2 Squamous cell carcinoma of larynx.   Patient evaluated by Dr. Correa on 5/22/23. Laryngoscopy: No lesions in the NPx, OPx, HPx. Exam of the larynx with mass involving the R. VC which is paralyzed, L. VC is mobile, patient is intuitable.  -concurrent chemo RT discussed with patient weekly Cisplatin. Creatinine as of 5/20/23 at 1.16.  -Baseline hearing test done on 6/21/23 -Dental clearance -Creatinine at 1.72 on 8/10/23, advised patient to increase fluid intake. -Rx sent for Magnesium if magnesum trends low, will advise to take -s/p screening colonoscopy and endoscopy, WNL per patients son  -Patient started RT on 7/21/23 and start weekly Cisplatin on 7/24/23 - Last day of RT is scheduled for 9/12/23 - C4 given today  - F/U in 2-3 weeks with Priscila

## 2023-08-15 ENCOUNTER — APPOINTMENT (OUTPATIENT)
Age: 68
End: 2023-08-15

## 2023-08-15 DIAGNOSIS — R11.2 NAUSEA WITH VOMITING, UNSPECIFIED: ICD-10-CM

## 2023-08-15 DIAGNOSIS — Z51.11 ENCOUNTER FOR ANTINEOPLASTIC CHEMOTHERAPY: ICD-10-CM

## 2023-08-15 PROCEDURE — 77387B: CUSTOM | Mod: 26

## 2023-08-16 PROCEDURE — 77387B: CUSTOM | Mod: 26

## 2023-08-17 PROCEDURE — 77387B: CUSTOM | Mod: 26

## 2023-08-18 ENCOUNTER — RESULT REVIEW (OUTPATIENT)
Age: 68
End: 2023-08-18

## 2023-08-18 ENCOUNTER — APPOINTMENT (OUTPATIENT)
Dept: HEMATOLOGY ONCOLOGY | Facility: CLINIC | Age: 68
End: 2023-08-18

## 2023-08-18 LAB
BASOPHILS # BLD AUTO: 0 K/UL — SIGNIFICANT CHANGE UP (ref 0–0.2)
BASOPHILS NFR BLD AUTO: 0.7 % — SIGNIFICANT CHANGE UP (ref 0–2)
EOSINOPHIL # BLD AUTO: 0.2 K/UL — SIGNIFICANT CHANGE UP (ref 0–0.5)
EOSINOPHIL NFR BLD AUTO: 3.3 % — SIGNIFICANT CHANGE UP (ref 0–6)
HCT VFR BLD CALC: 32.2 % — LOW (ref 39–50)
HGB BLD-MCNC: 11.5 G/DL — LOW (ref 13–17)
LYMPHOCYTES # BLD AUTO: 0.3 K/UL — LOW (ref 1–3.3)
LYMPHOCYTES # BLD AUTO: 6.1 % — LOW (ref 13–44)
MCHC RBC-ENTMCNC: 32.8 PG — SIGNIFICANT CHANGE UP (ref 27–34)
MCHC RBC-ENTMCNC: 35.7 G/DL — SIGNIFICANT CHANGE UP (ref 32–36)
MCV RBC AUTO: 91.8 FL — SIGNIFICANT CHANGE UP (ref 80–100)
MONOCYTES # BLD AUTO: 0.6 K/UL — SIGNIFICANT CHANGE UP (ref 0–0.9)
MONOCYTES NFR BLD AUTO: 12.2 % — SIGNIFICANT CHANGE UP (ref 2–14)
NEUTROPHILS # BLD AUTO: 4.1 K/UL — SIGNIFICANT CHANGE UP (ref 1.8–7.4)
NEUTROPHILS NFR BLD AUTO: 77.7 % — HIGH (ref 43–77)
PLAT MORPH BLD: NORMAL — SIGNIFICANT CHANGE UP
PLATELET # BLD AUTO: 189 K/UL — SIGNIFICANT CHANGE UP (ref 150–400)
RBC # BLD: 3.51 M/UL — LOW (ref 4.2–5.8)
RBC # FLD: 11.4 % — SIGNIFICANT CHANGE UP (ref 10.3–14.5)
RBC BLD AUTO: NORMAL — SIGNIFICANT CHANGE UP
WBC # BLD: 5.2 K/UL — SIGNIFICANT CHANGE UP (ref 3.8–10.5)
WBC # FLD AUTO: 5.2 K/UL — SIGNIFICANT CHANGE UP (ref 3.8–10.5)

## 2023-08-18 PROCEDURE — 77014: CPT | Mod: 26

## 2023-08-21 ENCOUNTER — RESULT REVIEW (OUTPATIENT)
Age: 68
End: 2023-08-21

## 2023-08-21 ENCOUNTER — NON-APPOINTMENT (OUTPATIENT)
Age: 68
End: 2023-08-21

## 2023-08-21 ENCOUNTER — APPOINTMENT (OUTPATIENT)
Age: 68
End: 2023-08-21

## 2023-08-21 VITALS
RESPIRATION RATE: 16 BRPM | SYSTOLIC BLOOD PRESSURE: 119 MMHG | WEIGHT: 193 LBS | OXYGEN SATURATION: 95 % | HEART RATE: 72 BPM | BODY MASS INDEX: 26.18 KG/M2 | DIASTOLIC BLOOD PRESSURE: 68 MMHG

## 2023-08-21 LAB
ALBUMIN SERPL ELPH-MCNC: 4 G/DL
ALBUMIN SERPL ELPH-MCNC: 4.2 G/DL — SIGNIFICANT CHANGE UP (ref 3.3–5)
ALP BLD-CCNC: 97 U/L
ALP SERPL-CCNC: 101 U/L — SIGNIFICANT CHANGE UP (ref 40–120)
ALT FLD-CCNC: 23 U/L — SIGNIFICANT CHANGE UP (ref 10–45)
ALT SERPL-CCNC: 22 U/L
ANION GAP SERPL CALC-SCNC: 11 MMOL/L — SIGNIFICANT CHANGE UP (ref 5–17)
ANION GAP SERPL CALC-SCNC: 12 MMOL/L
AST SERPL-CCNC: 16 U/L
AST SERPL-CCNC: 22 U/L — SIGNIFICANT CHANGE UP (ref 10–40)
BASOPHILS # BLD AUTO: 0.1 K/UL — SIGNIFICANT CHANGE UP (ref 0–0.2)
BASOPHILS NFR BLD AUTO: 1.6 % — SIGNIFICANT CHANGE UP (ref 0–2)
BILIRUB SERPL-MCNC: 0.2 MG/DL — SIGNIFICANT CHANGE UP (ref 0.2–1.2)
BILIRUB SERPL-MCNC: 0.3 MG/DL
BUN SERPL-MCNC: 39 MG/DL
BUN SERPL-MCNC: 52 MG/DL — HIGH (ref 7–23)
CALCIUM SERPL-MCNC: 8.9 MG/DL
CALCIUM SERPL-MCNC: 9.4 MG/DL — SIGNIFICANT CHANGE UP (ref 8.4–10.5)
CHLORIDE SERPL-SCNC: 99 MMOL/L
CHLORIDE SERPL-SCNC: 99 MMOL/L — SIGNIFICANT CHANGE UP (ref 96–108)
CO2 SERPL-SCNC: 27 MMOL/L — SIGNIFICANT CHANGE UP (ref 22–31)
CO2 SERPL-SCNC: 28 MMOL/L
CREAT SERPL-MCNC: 1.58 MG/DL — HIGH (ref 0.5–1.3)
CREAT SERPL-MCNC: 1.61 MG/DL
EGFR: 46 ML/MIN/1.73M2
EGFR: 47 ML/MIN/1.73M2 — LOW
EOSINOPHIL # BLD AUTO: 0.2 K/UL — SIGNIFICANT CHANGE UP (ref 0–0.5)
EOSINOPHIL NFR BLD AUTO: 3.3 % — SIGNIFICANT CHANGE UP (ref 0–6)
GLUCOSE SERPL-MCNC: 119 MG/DL — HIGH (ref 70–99)
GLUCOSE SERPL-MCNC: 125 MG/DL
HCT VFR BLD CALC: 32.2 % — LOW (ref 39–50)
HGB BLD-MCNC: 11.6 G/DL — LOW (ref 13–17)
LYMPHOCYTES # BLD AUTO: 0.5 K/UL — LOW (ref 1–3.3)
LYMPHOCYTES # BLD AUTO: 7.6 % — LOW (ref 13–44)
MAGNESIUM SERPL-MCNC: 1.9 MG/DL
MAGNESIUM SERPL-MCNC: 2.2 MG/DL — SIGNIFICANT CHANGE UP (ref 1.6–2.6)
MCHC RBC-ENTMCNC: 32.6 PG — SIGNIFICANT CHANGE UP (ref 27–34)
MCHC RBC-ENTMCNC: 36.1 G/DL — HIGH (ref 32–36)
MCV RBC AUTO: 90.3 FL — SIGNIFICANT CHANGE UP (ref 80–100)
MONOCYTES # BLD AUTO: 0.6 K/UL — SIGNIFICANT CHANGE UP (ref 0–0.9)
MONOCYTES NFR BLD AUTO: 9.9 % — SIGNIFICANT CHANGE UP (ref 2–14)
NEUTROPHILS # BLD AUTO: 5 K/UL — SIGNIFICANT CHANGE UP (ref 1.8–7.4)
NEUTROPHILS NFR BLD AUTO: 77.6 % — HIGH (ref 43–77)
PLATELET # BLD AUTO: 164 K/UL — SIGNIFICANT CHANGE UP (ref 150–400)
POTASSIUM SERPL-MCNC: 4 MMOL/L — SIGNIFICANT CHANGE UP (ref 3.5–5.3)
POTASSIUM SERPL-SCNC: 4 MMOL/L — SIGNIFICANT CHANGE UP (ref 3.5–5.3)
POTASSIUM SERPL-SCNC: 4.2 MMOL/L
PROT SERPL-MCNC: 6.2 G/DL
PROT SERPL-MCNC: 7.3 G/DL — SIGNIFICANT CHANGE UP (ref 6–8.3)
RBC # BLD: 3.57 M/UL — LOW (ref 4.2–5.8)
RBC # FLD: 11.3 % — SIGNIFICANT CHANGE UP (ref 10.3–14.5)
SODIUM SERPL-SCNC: 138 MMOL/L — SIGNIFICANT CHANGE UP (ref 135–145)
SODIUM SERPL-SCNC: 139 MMOL/L
WBC # BLD: 6.5 K/UL — SIGNIFICANT CHANGE UP (ref 3.8–10.5)
WBC # FLD AUTO: 6.5 K/UL — SIGNIFICANT CHANGE UP (ref 3.8–10.5)

## 2023-08-21 PROCEDURE — 77427 RADIATION TX MANAGEMENT X5: CPT

## 2023-08-21 PROCEDURE — 77387B: CUSTOM | Mod: 26

## 2023-08-21 RX ORDER — AMLODIPINE BESYLATE 2.5 MG/1
1 TABLET ORAL
Refills: 0 | DISCHARGE

## 2023-08-21 NOTE — DISEASE MANAGEMENT
[Clinical] : TNM Stage: c [IV] : IV [FreeTextEntry4] : squamous cell carcinoma [TTNM] : 2 [NTNM] : 2 [MTNM] : 0 [de-identified] : 4000 cGy [de-identified] : 7000  cGy [de-identified] : glottis

## 2023-08-22 ENCOUNTER — NON-APPOINTMENT (OUTPATIENT)
Age: 68
End: 2023-08-22

## 2023-08-22 ENCOUNTER — APPOINTMENT (OUTPATIENT)
Age: 68
End: 2023-08-22

## 2023-08-22 PROCEDURE — 77387B: CUSTOM | Mod: 26

## 2023-08-23 PROCEDURE — 77387B: CUSTOM | Mod: 26

## 2023-08-24 PROCEDURE — 77387B: CUSTOM | Mod: 26

## 2023-08-25 ENCOUNTER — RESULT REVIEW (OUTPATIENT)
Age: 68
End: 2023-08-25

## 2023-08-25 ENCOUNTER — APPOINTMENT (OUTPATIENT)
Dept: HEMATOLOGY ONCOLOGY | Facility: CLINIC | Age: 68
End: 2023-08-25

## 2023-08-25 LAB
BASOPHILS # BLD AUTO: 0 K/UL — SIGNIFICANT CHANGE UP (ref 0–0.2)
BASOPHILS NFR BLD AUTO: 0.5 % — SIGNIFICANT CHANGE UP (ref 0–2)
EOSINOPHIL # BLD AUTO: 0.1 K/UL — SIGNIFICANT CHANGE UP (ref 0–0.5)
EOSINOPHIL NFR BLD AUTO: 2.1 % — SIGNIFICANT CHANGE UP (ref 0–6)
HCT VFR BLD CALC: 30.6 % — LOW (ref 39–50)
HGB BLD-MCNC: 11 G/DL — LOW (ref 13–17)
LYMPHOCYTES # BLD AUTO: 0.3 K/UL — LOW (ref 1–3.3)
LYMPHOCYTES # BLD AUTO: 5.2 % — LOW (ref 13–44)
MCHC RBC-ENTMCNC: 32.5 PG — SIGNIFICANT CHANGE UP (ref 27–34)
MCHC RBC-ENTMCNC: 35.8 G/DL — SIGNIFICANT CHANGE UP (ref 32–36)
MCV RBC AUTO: 90.7 FL — SIGNIFICANT CHANGE UP (ref 80–100)
MONOCYTES # BLD AUTO: 0.5 K/UL — SIGNIFICANT CHANGE UP (ref 0–0.9)
MONOCYTES NFR BLD AUTO: 10.3 % — SIGNIFICANT CHANGE UP (ref 2–14)
NEUTROPHILS # BLD AUTO: 3.9 K/UL — SIGNIFICANT CHANGE UP (ref 1.8–7.4)
NEUTROPHILS NFR BLD AUTO: 81.9 % — HIGH (ref 43–77)
PLATELET # BLD AUTO: 156 K/UL — SIGNIFICANT CHANGE UP (ref 150–400)
RBC # BLD: 3.37 M/UL — LOW (ref 4.2–5.8)
RBC # FLD: 11.2 % — SIGNIFICANT CHANGE UP (ref 10.3–14.5)
WBC # BLD: 4.8 K/UL — SIGNIFICANT CHANGE UP (ref 3.8–10.5)
WBC # FLD AUTO: 4.8 K/UL — SIGNIFICANT CHANGE UP (ref 3.8–10.5)

## 2023-08-25 PROCEDURE — 77014: CPT | Mod: 26

## 2023-08-28 ENCOUNTER — NON-APPOINTMENT (OUTPATIENT)
Age: 68
End: 2023-08-28

## 2023-08-28 ENCOUNTER — TRANSCRIPTION ENCOUNTER (OUTPATIENT)
Age: 68
End: 2023-08-28

## 2023-08-28 ENCOUNTER — RESULT REVIEW (OUTPATIENT)
Age: 68
End: 2023-08-28

## 2023-08-28 ENCOUNTER — APPOINTMENT (OUTPATIENT)
Age: 68
End: 2023-08-28

## 2023-08-28 VITALS
WEIGHT: 190 LBS | OXYGEN SATURATION: 96 % | BODY MASS INDEX: 25.77 KG/M2 | DIASTOLIC BLOOD PRESSURE: 75 MMHG | RESPIRATION RATE: 16 BRPM | HEART RATE: 73 BPM | SYSTOLIC BLOOD PRESSURE: 128 MMHG

## 2023-08-28 DIAGNOSIS — L58.0 ACUTE RADIODERMATITIS: ICD-10-CM

## 2023-08-28 LAB
ALBUMIN SERPL ELPH-MCNC: 3.9 G/DL — SIGNIFICANT CHANGE UP (ref 3.3–5)
ALP SERPL-CCNC: 102 U/L — SIGNIFICANT CHANGE UP (ref 40–120)
ALT FLD-CCNC: 20 U/L — SIGNIFICANT CHANGE UP (ref 10–45)
ANION GAP SERPL CALC-SCNC: 12 MMOL/L — SIGNIFICANT CHANGE UP (ref 5–17)
AST SERPL-CCNC: 20 U/L — SIGNIFICANT CHANGE UP (ref 10–40)
BASOPHILS # BLD AUTO: 0 K/UL — SIGNIFICANT CHANGE UP (ref 0–0.2)
BASOPHILS NFR BLD AUTO: 0.7 % — SIGNIFICANT CHANGE UP (ref 0–2)
BILIRUB SERPL-MCNC: 0.3 MG/DL — SIGNIFICANT CHANGE UP (ref 0.2–1.2)
BUN SERPL-MCNC: 47 MG/DL — HIGH (ref 7–23)
CALCIUM SERPL-MCNC: 9.4 MG/DL — SIGNIFICANT CHANGE UP (ref 8.4–10.5)
CHLORIDE SERPL-SCNC: 103 MMOL/L — SIGNIFICANT CHANGE UP (ref 96–108)
CO2 SERPL-SCNC: 26 MMOL/L — SIGNIFICANT CHANGE UP (ref 22–31)
CREAT SERPL-MCNC: 1.5 MG/DL — HIGH (ref 0.5–1.3)
EGFR: 50 ML/MIN/1.73M2 — LOW
EOSINOPHIL # BLD AUTO: 0.1 K/UL — SIGNIFICANT CHANGE UP (ref 0–0.5)
EOSINOPHIL NFR BLD AUTO: 2.3 % — SIGNIFICANT CHANGE UP (ref 0–6)
GLUCOSE SERPL-MCNC: 181 MG/DL — HIGH (ref 70–99)
HCT VFR BLD CALC: 29.8 % — LOW (ref 39–50)
HGB BLD-MCNC: 10.6 G/DL — LOW (ref 13–17)
LYMPHOCYTES # BLD AUTO: 0.2 K/UL — LOW (ref 1–3.3)
LYMPHOCYTES # BLD AUTO: 4.7 % — LOW (ref 13–44)
MAGNESIUM SERPL-MCNC: 2.1 MG/DL — SIGNIFICANT CHANGE UP (ref 1.6–2.6)
MCHC RBC-ENTMCNC: 32.3 PG — SIGNIFICANT CHANGE UP (ref 27–34)
MCHC RBC-ENTMCNC: 35.6 G/DL — SIGNIFICANT CHANGE UP (ref 32–36)
MCV RBC AUTO: 90.8 FL — SIGNIFICANT CHANGE UP (ref 80–100)
MONOCYTES # BLD AUTO: 0.4 K/UL — SIGNIFICANT CHANGE UP (ref 0–0.9)
MONOCYTES NFR BLD AUTO: 8.3 % — SIGNIFICANT CHANGE UP (ref 2–14)
NEUTROPHILS # BLD AUTO: 3.7 K/UL — SIGNIFICANT CHANGE UP (ref 1.8–7.4)
NEUTROPHILS NFR BLD AUTO: 84 % — HIGH (ref 43–77)
PLATELET # BLD AUTO: 148 K/UL — LOW (ref 150–400)
POTASSIUM SERPL-MCNC: 4 MMOL/L — SIGNIFICANT CHANGE UP (ref 3.5–5.3)
POTASSIUM SERPL-SCNC: 4 MMOL/L — SIGNIFICANT CHANGE UP (ref 3.5–5.3)
PROT SERPL-MCNC: 6.8 G/DL — SIGNIFICANT CHANGE UP (ref 6–8.3)
RBC # BLD: 3.28 M/UL — LOW (ref 4.2–5.8)
RBC # FLD: 11.4 % — SIGNIFICANT CHANGE UP (ref 10.3–14.5)
SODIUM SERPL-SCNC: 141 MMOL/L — SIGNIFICANT CHANGE UP (ref 135–145)
WBC # BLD: 4.4 K/UL — SIGNIFICANT CHANGE UP (ref 3.8–10.5)
WBC # FLD AUTO: 4.4 K/UL — SIGNIFICANT CHANGE UP (ref 3.8–10.5)

## 2023-08-28 PROCEDURE — 77387B: CUSTOM | Mod: 26

## 2023-08-28 PROCEDURE — 77427 RADIATION TX MANAGEMENT X5: CPT

## 2023-08-28 NOTE — DISEASE MANAGEMENT
[Clinical] : TNM Stage: c [IV] : IV [FreeTextEntry4] : squamous cell carcinoma [TTNM] : 2 [NTNM] : 2 [MTNM] : 0 [de-identified] : 5000 cGy [de-identified] : 7000  cGy [de-identified] : glottis

## 2023-08-28 NOTE — VITALS
[Maximal Pain Intensity: 4/10] : 4/10 [Least Pain Intensity: 1/10] : 1/10 [Pain Description/Quality: ___] : Pain description/quality: [unfilled] [Pain Duration: ___] : Pain duration: [unfilled] [Pain Location: ___] : Pain Location: [unfilled] [OTC] : OTC [80: Normal activity with effort; some signs or symptoms of disease.] : 80: Normal activity with effort; some signs or symptoms of disease.  [80: Active, but tires more quickly] : 80: Active, but tires more quickly [Pain Interferes with ADLs] : Pain does not interfere with activities of daily living

## 2023-08-29 ENCOUNTER — APPOINTMENT (OUTPATIENT)
Age: 68
End: 2023-08-29

## 2023-08-29 PROCEDURE — 77387B: CUSTOM | Mod: 26

## 2023-08-30 PROCEDURE — 77387B: CUSTOM | Mod: 26

## 2023-08-31 PROCEDURE — 77387B: CUSTOM | Mod: 26

## 2023-09-01 ENCOUNTER — APPOINTMENT (OUTPATIENT)
Dept: HEMATOLOGY ONCOLOGY | Facility: CLINIC | Age: 68
End: 2023-09-01
Payer: MEDICARE

## 2023-09-01 VITALS
TEMPERATURE: 97.1 F | BODY MASS INDEX: 25.6 KG/M2 | HEART RATE: 74 BPM | OXYGEN SATURATION: 100 % | SYSTOLIC BLOOD PRESSURE: 127 MMHG | HEIGHT: 72 IN | DIASTOLIC BLOOD PRESSURE: 76 MMHG | WEIGHT: 189 LBS

## 2023-09-01 LAB
ALBUMIN SERPL ELPH-MCNC: 4 G/DL
ALP BLD-CCNC: 96 U/L
ALT SERPL-CCNC: 23 U/L
ANION GAP SERPL CALC-SCNC: 13 MMOL/L
AST SERPL-CCNC: 19 U/L
BILIRUB SERPL-MCNC: 0.4 MG/DL
BUN SERPL-MCNC: 44 MG/DL
CALCIUM SERPL-MCNC: 8.8 MG/DL
CHLORIDE SERPL-SCNC: 101 MMOL/L
CO2 SERPL-SCNC: 26 MMOL/L
CREAT SERPL-MCNC: 1.56 MG/DL
EGFR: 48 ML/MIN/1.73M2
GLUCOSE SERPL-MCNC: 150 MG/DL
POTASSIUM SERPL-SCNC: 4.2 MMOL/L
PROT SERPL-MCNC: 6.4 G/DL
SODIUM SERPL-SCNC: 141 MMOL/L

## 2023-09-01 PROCEDURE — 77014: CPT | Mod: 26

## 2023-09-01 PROCEDURE — 99214 OFFICE O/P EST MOD 30 MIN: CPT

## 2023-09-01 NOTE — HISTORY OF PRESENT ILLNESS
[de-identified] : LISSET RUBALCAVA is a 68 year M with PMHX of HTN?, HLD?, current smoker (2-3 cig a day recently, before 5-6/day for 50 yrs)  presents for initial consultation for laryngeal cancer.\par  \par  Patient was noted to have hoarseness for a month. A CT neck done 5/5/2023 showed asymmetric thickening and medialization of the right true vocal fold relative to the left side. No suspicious cervical adenopathy.\par  \par  Patient evaluated by Dr. Correa on 5/22/23. Laryngoscopy: No lesions in the NPx, OPx, HPx. Exam of the larynx with mass involving the R. VC which is paralyzed, L. VC is mobile, patient is intubatable.\par  \par  On 6/1/2023,  he underwent a biopsy of a right vocal cord laryngeal lesion. This revealed squamous cell carcinoma, well differentiated.\par  \par  PEt/Ct on 6/13 reports a hypermetabolic soft tissue mass centered on the anterior and anteromedial RIGHT true vocal cord with extension across the midline . \par  Hypermetabolic nodes are present BILATERALLY. Reference examples include:\par  Left level 2A (1.0 x 0.7 cm, SUV 3.7,)\par  Left level IIb (0.6 x 0.7 cm, SUV 4.3,)\par  Ill-defined focus posterolateral to RIGHT tongue base (SUV 4.6,)\par  Right supraclavicular (0.9 x 0.7 cm, SUV 4.8, and 0.7 x 0.6 cm, SUV 4.6,)\par  Ill-defined node at the RIGHT thoracic inlet inferior to the RIGHT lobe of the thyroid (SUV 6.9)\par  \par  IMPRESSION:\par  1.  Hypermetabolic RIGHT laryngeal tumor with extension across the midline.\par  2.  Findings suspicious for multiple BILATERAL lymph nodes with increased uptake where metastatic disease is not excluded. These are seen in the neck and neck bases.\par  3.  Proximal gastric uptake, nonspecific. Correlate clinically.\par  4.  Increased uptake at distal RIGHT colon, which may be physiologic or inflammatory.\par  \par  \par  \par  \par   [de-identified] : Patient presents for follow up with son Marv  Patient scheduled to start RT 7/21/23 and start weekly Cisplatin on 7/24/23 Patient s/p C6 on 8/28/23. Plan to completed RT on 9/12/23  Patient reports poor appetite due to taste. He is drinking 4 ensures a day Patient able to swallow, feels some discomfort Admits fatigue Reports Mild Nausea, taking antinausea with relief  Admits phlegm in throat causing coughing  Denies fever/chills, rash, mouth sore, vomiting, diarrhea, constipation, abdominal pain, bleeding, easy bruising or visual changes, chest pain, cough, SOB or MACEDO,  neuropathy, LE edema. Reports recent colonoscopy, 1 polyp removal everything normal per patient.   Stopped smoking 2 days ago  He is retired.   7/5/23 MR Orbit   IMPRESSION:  Motion degraded exam.  Diffuse thickening and enhancement involving the right vocal cord compatible with the patient's known history of squamous cell carcinoma which crosses midline at the anterior commissure. Mild subglottic extension.  No evidence of extralaryngeal tumor extension.  No cervical adenopathy identified.  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

## 2023-09-01 NOTE — ASSESSMENT
[FreeTextEntry1] : LISSET RUBALCAVA is a 68 year M with PMHX of HTN?, HLD?, current smoker (2-3 cig a day recently, before 5-6/day for 50 yrs)  presents for initial consultation for Advanced/very advanced head and neck carcinoma possibly T4 at least N2 Squamous cell carcinoma of larynx.   Patient evaluated by Dr. Correa on 5/22/23. Laryngoscopy: No lesions in the NPx, OPx, HPx. Exam of the larynx with mass involving the R. VC which is paralyzed, L. VC is mobile, patient is intuitable.  -concurrent chemo RT discussed with patient weekly Cisplatin. Creatinine as of 5/20/23 at 1.16.  -Baseline hearing test done on 6/21/23 -Dental clearance -Creatinine at 1.72 on 8/10/23, advised patient to increase fluid intake. -Rx sent for Magnesium if magnesum trends low, will advise to take -s/p screening colonoscopy and endoscopy, WNL per patients son  -Patient started RT on 7/21/23 and start weekly Cisplatin on 7/24/23 - Last day of RT is scheduled for 9/12/23 - C6 given on 8/28/23 - Continue f/u with RT and Dr. Correa - F/U in 6 weeks with PA - F/U in 12 weeks with MD

## 2023-09-05 ENCOUNTER — NON-APPOINTMENT (OUTPATIENT)
Age: 68
End: 2023-09-05

## 2023-09-05 VITALS
WEIGHT: 186 LBS | HEART RATE: 81 BPM | SYSTOLIC BLOOD PRESSURE: 111 MMHG | OXYGEN SATURATION: 99 % | RESPIRATION RATE: 16 BRPM | BODY MASS INDEX: 25.23 KG/M2 | DIASTOLIC BLOOD PRESSURE: 71 MMHG

## 2023-09-05 PROCEDURE — 77427 RADIATION TX MANAGEMENT X5: CPT

## 2023-09-05 PROCEDURE — 77387B: CUSTOM | Mod: 26

## 2023-09-05 NOTE — VITALS
[Maximal Pain Intensity: 3/10] : 3/10 [Least Pain Intensity: 0/10] : 0/10 [Pain Description/Quality: ___] : Pain description/quality: [unfilled] [Pain Duration: ___] : Pain duration: [unfilled] [Pain Location: ___] : Pain Location: [unfilled] [Pain Interferes with ADLs] : Pain does not interfere with activities of daily living [Adjuvant (neuroleptics)] : adjuvant (neuroleptics) [80: Normal activity with effort; some signs or symptoms of disease.] : 80: Normal activity with effort; some signs or symptoms of disease.  [80: Active, but tires more quickly] : 80: Active, but tires more quickly

## 2023-09-05 NOTE — DISEASE MANAGEMENT
[Clinical] : TNM Stage: c [FreeTextEntry4] : squamous cell carcinoma [TTNM] : 2 [NTNM] : 2 [MTNM] : 0 [IV] : IV [de-identified] : 6000 cGy [de-identified] : 7000  cGy [de-identified] : glottis

## 2023-09-06 PROCEDURE — 77387B: CUSTOM | Mod: 26

## 2023-09-07 PROCEDURE — 77387B: CUSTOM | Mod: 26

## 2023-09-08 PROCEDURE — 77387B: CUSTOM | Mod: 26

## 2023-09-11 ENCOUNTER — NON-APPOINTMENT (OUTPATIENT)
Age: 68
End: 2023-09-11

## 2023-09-11 VITALS
BODY MASS INDEX: 25.23 KG/M2 | SYSTOLIC BLOOD PRESSURE: 109 MMHG | WEIGHT: 186 LBS | OXYGEN SATURATION: 97 % | RESPIRATION RATE: 16 BRPM | DIASTOLIC BLOOD PRESSURE: 66 MMHG | HEART RATE: 73 BPM

## 2023-09-11 PROCEDURE — 77387B: CUSTOM | Mod: 26

## 2023-09-12 PROCEDURE — 77427 RADIATION TX MANAGEMENT X5: CPT

## 2023-09-12 PROCEDURE — 77014: CPT | Mod: 26

## 2023-09-25 ENCOUNTER — APPOINTMENT (OUTPATIENT)
Dept: OTOLARYNGOLOGY | Facility: CLINIC | Age: 68
End: 2023-09-25
Payer: MEDICARE

## 2023-09-25 PROCEDURE — 31575 DIAGNOSTIC LARYNGOSCOPY: CPT

## 2023-09-25 PROCEDURE — 99214 OFFICE O/P EST MOD 30 MIN: CPT | Mod: 25

## 2023-09-25 RX ORDER — ONDANSETRON 8 MG/1
8 TABLET ORAL
Qty: 90 | Refills: 1 | Status: COMPLETED | COMMUNITY
Start: 2023-07-10 | End: 2023-09-06

## 2023-09-25 RX ORDER — PROCHLORPERAZINE MALEATE 10 MG/1
10 TABLET ORAL EVERY 6 HOURS
Qty: 120 | Refills: 3 | Status: COMPLETED | COMMUNITY
Start: 2023-07-10 | End: 2023-08-30

## 2023-09-25 RX ORDER — MAGNESIUM OXIDE 241.3 MG/1000MG
400 TABLET ORAL
Qty: 30 | Refills: 0 | Status: COMPLETED | COMMUNITY
Start: 2023-08-14 | End: 2023-09-14

## 2023-09-26 ENCOUNTER — NON-APPOINTMENT (OUTPATIENT)
Age: 68
End: 2023-09-26

## 2023-10-07 ENCOUNTER — OUTPATIENT (OUTPATIENT)
Dept: OUTPATIENT SERVICES | Facility: HOSPITAL | Age: 68
LOS: 1 days | Discharge: HOME | End: 2023-10-07

## 2023-10-07 DIAGNOSIS — C32.0 MALIGNANT NEOPLASM OF GLOTTIS: ICD-10-CM

## 2023-10-07 DIAGNOSIS — Z98.890 OTHER SPECIFIED POSTPROCEDURAL STATES: Chronic | ICD-10-CM

## 2023-10-11 ENCOUNTER — APPOINTMENT (OUTPATIENT)
Dept: RADIATION ONCOLOGY | Facility: CLINIC | Age: 68
End: 2023-10-11
Payer: MEDICARE

## 2023-10-11 VITALS
RESPIRATION RATE: 16 BRPM | SYSTOLIC BLOOD PRESSURE: 124 MMHG | WEIGHT: 184 LBS | OXYGEN SATURATION: 97 % | HEART RATE: 85 BPM | DIASTOLIC BLOOD PRESSURE: 71 MMHG | BODY MASS INDEX: 24.95 KG/M2

## 2023-10-11 PROCEDURE — 99214 OFFICE O/P EST MOD 30 MIN: CPT | Mod: 25

## 2023-10-13 ENCOUNTER — APPOINTMENT (OUTPATIENT)
Dept: HEMATOLOGY ONCOLOGY | Facility: CLINIC | Age: 68
End: 2023-10-13

## 2023-12-08 ENCOUNTER — OUTPATIENT (OUTPATIENT)
Dept: OUTPATIENT SERVICES | Facility: HOSPITAL | Age: 68
LOS: 1 days | Discharge: HOME | End: 2023-12-08

## 2023-12-08 DIAGNOSIS — C32.0 MALIGNANT NEOPLASM OF GLOTTIS: ICD-10-CM

## 2023-12-08 DIAGNOSIS — Z98.890 OTHER SPECIFIED POSTPROCEDURAL STATES: Chronic | ICD-10-CM

## 2023-12-11 ENCOUNTER — APPOINTMENT (OUTPATIENT)
Dept: NUCLEAR MEDICINE | Facility: CLINIC | Age: 68
End: 2023-12-11
Payer: MEDICARE

## 2023-12-11 ENCOUNTER — APPOINTMENT (OUTPATIENT)
Dept: CT IMAGING | Facility: CLINIC | Age: 68
End: 2023-12-11
Payer: MEDICARE

## 2023-12-11 ENCOUNTER — OUTPATIENT (OUTPATIENT)
Dept: OUTPATIENT SERVICES | Facility: HOSPITAL | Age: 68
LOS: 1 days | End: 2023-12-11
Payer: MEDICARE

## 2023-12-11 DIAGNOSIS — Z98.890 OTHER SPECIFIED POSTPROCEDURAL STATES: Chronic | ICD-10-CM

## 2023-12-11 DIAGNOSIS — C32.9 MALIGNANT NEOPLASM OF LARYNX, UNSPECIFIED: ICD-10-CM

## 2023-12-11 PROCEDURE — 78815 PET IMAGE W/CT SKULL-THIGH: CPT

## 2023-12-11 PROCEDURE — 78815 PET IMAGE W/CT SKULL-THIGH: CPT | Mod: 26,PS

## 2023-12-11 PROCEDURE — 70491 CT SOFT TISSUE NECK W/DYE: CPT | Mod: 26

## 2023-12-11 PROCEDURE — 70491 CT SOFT TISSUE NECK W/DYE: CPT

## 2023-12-11 PROCEDURE — A9552: CPT

## 2023-12-15 ENCOUNTER — RESULT REVIEW (OUTPATIENT)
Age: 68
End: 2023-12-15

## 2023-12-15 ENCOUNTER — APPOINTMENT (OUTPATIENT)
Dept: HEMATOLOGY ONCOLOGY | Facility: CLINIC | Age: 68
End: 2023-12-15
Payer: MEDICARE

## 2023-12-15 VITALS
HEART RATE: 61 BPM | DIASTOLIC BLOOD PRESSURE: 81 MMHG | HEIGHT: 72 IN | BODY MASS INDEX: 24.38 KG/M2 | SYSTOLIC BLOOD PRESSURE: 164 MMHG | WEIGHT: 180.04 LBS | OXYGEN SATURATION: 100 %

## 2023-12-15 LAB
ALBUMIN SERPL ELPH-MCNC: 4.3 G/DL
ALP BLD-CCNC: 88 U/L
ALT SERPL-CCNC: 12 U/L
ANION GAP SERPL CALC-SCNC: 10 MMOL/L
AST SERPL-CCNC: 19 U/L
BASOPHILS # BLD AUTO: 0.1 K/UL — SIGNIFICANT CHANGE UP (ref 0–0.2)
BASOPHILS # BLD AUTO: 0.1 K/UL — SIGNIFICANT CHANGE UP (ref 0–0.2)
BASOPHILS NFR BLD AUTO: 1.5 % — SIGNIFICANT CHANGE UP (ref 0–2)
BASOPHILS NFR BLD AUTO: 1.5 % — SIGNIFICANT CHANGE UP (ref 0–2)
BILIRUB SERPL-MCNC: 0.2 MG/DL
BUN SERPL-MCNC: 18 MG/DL
CALCIUM SERPL-MCNC: 10.4 MG/DL
CHLORIDE SERPL-SCNC: 101 MMOL/L
CO2 SERPL-SCNC: 30 MMOL/L
CREAT SERPL-MCNC: 1.92 MG/DL
EGFR: 37 ML/MIN/1.73M2
EOSINOPHIL # BLD AUTO: 0.2 K/UL — SIGNIFICANT CHANGE UP (ref 0–0.5)
EOSINOPHIL # BLD AUTO: 0.2 K/UL — SIGNIFICANT CHANGE UP (ref 0–0.5)
EOSINOPHIL NFR BLD AUTO: 4.9 % — SIGNIFICANT CHANGE UP (ref 0–6)
EOSINOPHIL NFR BLD AUTO: 4.9 % — SIGNIFICANT CHANGE UP (ref 0–6)
GLUCOSE SERPL-MCNC: 107 MG/DL
HCT VFR BLD CALC: 34.8 % — LOW (ref 39–50)
HCT VFR BLD CALC: 34.8 % — LOW (ref 39–50)
HGB BLD-MCNC: 11.4 G/DL — LOW (ref 13–17)
HGB BLD-MCNC: 11.4 G/DL — LOW (ref 13–17)
LYMPHOCYTES # BLD AUTO: 0.5 K/UL — LOW (ref 1–3.3)
LYMPHOCYTES # BLD AUTO: 0.5 K/UL — LOW (ref 1–3.3)
LYMPHOCYTES # BLD AUTO: 10 % — LOW (ref 13–44)
LYMPHOCYTES # BLD AUTO: 10 % — LOW (ref 13–44)
MAGNESIUM SERPL-MCNC: 2.2 MG/DL
MCHC RBC-ENTMCNC: 32.6 G/DL — SIGNIFICANT CHANGE UP (ref 32–36)
MCHC RBC-ENTMCNC: 32.6 G/DL — SIGNIFICANT CHANGE UP (ref 32–36)
MCHC RBC-ENTMCNC: 33.2 PG — SIGNIFICANT CHANGE UP (ref 27–34)
MCHC RBC-ENTMCNC: 33.2 PG — SIGNIFICANT CHANGE UP (ref 27–34)
MCV RBC AUTO: 101.6 FL — HIGH (ref 80–100)
MCV RBC AUTO: 101.6 FL — HIGH (ref 80–100)
MONOCYTES # BLD AUTO: 0.5 K/UL — SIGNIFICANT CHANGE UP (ref 0–0.9)
MONOCYTES # BLD AUTO: 0.5 K/UL — SIGNIFICANT CHANGE UP (ref 0–0.9)
MONOCYTES NFR BLD AUTO: 9.2 % — SIGNIFICANT CHANGE UP (ref 2–14)
MONOCYTES NFR BLD AUTO: 9.2 % — SIGNIFICANT CHANGE UP (ref 2–14)
NEUTROPHILS # BLD AUTO: 3.7 K/UL — SIGNIFICANT CHANGE UP (ref 1.8–7.4)
NEUTROPHILS # BLD AUTO: 3.7 K/UL — SIGNIFICANT CHANGE UP (ref 1.8–7.4)
NEUTROPHILS NFR BLD AUTO: 74.5 % — SIGNIFICANT CHANGE UP (ref 43–77)
NEUTROPHILS NFR BLD AUTO: 74.5 % — SIGNIFICANT CHANGE UP (ref 43–77)
PLATELET # BLD AUTO: 247 K/UL — SIGNIFICANT CHANGE UP (ref 150–400)
PLATELET # BLD AUTO: 247 K/UL — SIGNIFICANT CHANGE UP (ref 150–400)
POTASSIUM SERPL-SCNC: 5 MMOL/L
PROT SERPL-MCNC: 6.5 G/DL
RBC # BLD: 3.42 M/UL — LOW (ref 4.2–5.8)
RBC # BLD: 3.42 M/UL — LOW (ref 4.2–5.8)
RBC # FLD: 12.4 % — SIGNIFICANT CHANGE UP (ref 10.3–14.5)
RBC # FLD: 12.4 % — SIGNIFICANT CHANGE UP (ref 10.3–14.5)
SODIUM SERPL-SCNC: 141 MMOL/L
WBC # BLD: 4.9 K/UL — SIGNIFICANT CHANGE UP (ref 3.8–10.5)
WBC # BLD: 4.9 K/UL — SIGNIFICANT CHANGE UP (ref 3.8–10.5)
WBC # FLD AUTO: 4.9 K/UL — SIGNIFICANT CHANGE UP (ref 3.8–10.5)
WBC # FLD AUTO: 4.9 K/UL — SIGNIFICANT CHANGE UP (ref 3.8–10.5)

## 2023-12-15 PROCEDURE — 99215 OFFICE O/P EST HI 40 MIN: CPT

## 2023-12-15 NOTE — HISTORY OF PRESENT ILLNESS
[de-identified] : LISSET RUBALCAVA is a 68 year M with PMHX of HTN?, HLD?, current smoker (2-3 cig a day recently, before 5-6/day for 50 yrs)  presents for initial consultation for laryngeal cancer.\par  \par  Patient was noted to have hoarseness for a month. A CT neck done 5/5/2023 showed asymmetric thickening and medialization of the right true vocal fold relative to the left side. No suspicious cervical adenopathy.\par  \par  Patient evaluated by Dr. Correa on 5/22/23. Laryngoscopy: No lesions in the NPx, OPx, HPx. Exam of the larynx with mass involving the R. VC which is paralyzed, L. VC is mobile, patient is intubatable.\par  \par  On 6/1/2023,  he underwent a biopsy of a right vocal cord laryngeal lesion. This revealed squamous cell carcinoma, well differentiated.\par  \par  PEt/Ct on 6/13 reports a hypermetabolic soft tissue mass centered on the anterior and anteromedial RIGHT true vocal cord with extension across the midline . \par  Hypermetabolic nodes are present BILATERALLY. Reference examples include:\par  Left level 2A (1.0 x 0.7 cm, SUV 3.7,)\par  Left level IIb (0.6 x 0.7 cm, SUV 4.3,)\par  Ill-defined focus posterolateral to RIGHT tongue base (SUV 4.6,)\par  Right supraclavicular (0.9 x 0.7 cm, SUV 4.8, and 0.7 x 0.6 cm, SUV 4.6,)\par  Ill-defined node at the RIGHT thoracic inlet inferior to the RIGHT lobe of the thyroid (SUV 6.9)\par  \par  IMPRESSION:\par  1.  Hypermetabolic RIGHT laryngeal tumor with extension across the midline.\par  2.  Findings suspicious for multiple BILATERAL lymph nodes with increased uptake where metastatic disease is not excluded. These are seen in the neck and neck bases.\par  3.  Proximal gastric uptake, nonspecific. Correlate clinically.\par  4.  Increased uptake at distal RIGHT colon, which may be physiologic or inflammatory.\par  \par  \par  \par  \par   [de-identified] : Patient presents for follow up with son Marv and wife  Patient scheduled to start RT 7/21/23 and start weekly Cisplatin on 7/24/23 Patient s/p C6 on 8/28/23. Completed RT on 9/12/23 Reports recent colonoscopy, 1 polyp removal everything normal per patient.   Still smoking intermittently  He is retired.    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

## 2023-12-15 NOTE — RESULTS/DATA
[FreeTextEntry1] :  7/5/23 MR Orbit  IMPRESSION:  Motion degraded exam. Diffuse thickening and enhancement involving the right vocal cord compatible with the patient's known history of squamous cell carcinoma which crosses midline at the anterior commissure. Mild subglottic extension. No evidence of extralaryngeal tumor extension. No cervical adenopathy identified  .6/13/23 PEt/CT  There is a hypermetabolic soft tissue mass centered on the anterior and anteromedial RIGHT true vocal cord with extension across the midline (SUV 21.4, image 132, dedicated head and neck series). Adenoids, tonsils, nasopharynx, oropharynx, hypopharynx, remaining trachea without suspicious uptake, anatomic effacement or mass, or other anatomic abnormality. Tongue without suspicious findings.  Hypermetabolic nodes are present BILATERALLY. Reference examples include:  Left level 2A (1.0 x 0.7 cm, SUV 3.7, image 103) Left level IIb (0.6 x 0.7 cm, SUV 4.3, image 111) Ill-defined focus posterolateral to RIGHT tongue base (SUV 4.6, image 105) Right supraclavicular (0.9 x 0.7 cm, SUV 4.8, and 0.7 x 0.6 cm, SUV 4.6, image 149) Ill-defined node at the RIGHT thoracic inlet inferior to the RIGHT lobe of the thyroid (SUV 6.9, image 159)    IMPRESSION: 1.  Hypermetabolic RIGHT laryngeal tumor with extension across the midline. 2.  Findings suspicious for multiple BILATERAL lymph nodes with increased uptake where metastatic disease is not excluded. These are seen in the neck and neck bases. 3.  Proximal gastric uptake, nonspecific. Correlate clinically. 4.  Increased uptake at distal RIGHT colon, which may be physiologic or inflammatory.   6/2/2023 Surgical Pathology  Final Diagnosis  1. Larynx, right vocal cord lesion, biopsy - Squamous cell carcinoma, well differentiated  5/5/23 Ct Neck   asymmetric thickening and medialization of the right true vocal fold relative to the left side. No suspicious cervical adenopathy.

## 2023-12-15 NOTE — ASSESSMENT
[FreeTextEntry1] : LISSET RUBALCAVA is a 68 year M with PMHX of HTN?, HLD?, current smoker (2-3 cig a day recently, before 5-6/day for 50 yrs) presents for initial consultation for Advanced/very advanced head and neck carcinoma possibly T4 at least N2 Squamous cell carcinoma of larynx.  Patient evaluated by Dr. Correa on 5/22/23. Laryngoscopy: No lesions in the NPx, OPx, HPx. Exam of the larynx with mass involving the R. VC which is paralyzed, L. VC is mobile, patient is intuitable.  -concurrent chemo RT discussed with patient weekly Cisplatin. Creatinine as of 5/20/23 at 1.16. -Baseline hearing test done on 6/21/23 -Dental clearance -Creatinine at 1.72 on 8/10/23, advised patient to increase fluid intake. -Rx sent for Magnesium if magnesium trends low, will advise to take -s/p screening colonoscopy and endoscopy, WNL per patients son -Patient started RT on 7/21/23 and start weekly Cisplatin on 7/24/23, completed RT on 9/12/23 - C6 CISPLATIN  given on 8/28/23 -CT NECK 12/11/23 : Stable interval follow-up CT examination without evidence of neoplastic disease progression nor recurrence. No new or enlarging cervical lymph nodes. - f/atul Correa and with jeovany in 3 months

## 2023-12-18 ENCOUNTER — APPOINTMENT (OUTPATIENT)
Dept: OTOLARYNGOLOGY | Facility: CLINIC | Age: 68
End: 2023-12-18
Payer: MEDICARE

## 2023-12-18 VITALS
DIASTOLIC BLOOD PRESSURE: 96 MMHG | BODY MASS INDEX: 24.11 KG/M2 | SYSTOLIC BLOOD PRESSURE: 172 MMHG | HEART RATE: 56 BPM | HEIGHT: 72 IN | WEIGHT: 178 LBS

## 2023-12-18 PROCEDURE — 99214 OFFICE O/P EST MOD 30 MIN: CPT | Mod: 25

## 2023-12-18 PROCEDURE — 31575 DIAGNOSTIC LARYNGOSCOPY: CPT

## 2023-12-18 NOTE — PHYSICAL EXAM
[Normal] : no rashes [Laryngoscopy Performed] : laryngoscopy was performed, see procedure section for findings [de-identified] : Posttreatment changes, no LAD. [FreeTextEntry1] : No concerning lesions in the OC/OPx on inspection or palpation.

## 2023-12-18 NOTE — HISTORY OF PRESENT ILLNESS
[de-identified] : 68 year old male following up for vocal cord lesion. History of smoking 2-3 cig.day recently, before 5-6/day for 50 yrs.  no etoh. s/p Direct laryngoscopy with biopsy with biopsy, esophagoscopy 6/1/23 showed Squamous cell carcinoma, well differentiated.  Pt is on chemoRT with Dr. Lovett and Dr. Morales, and completed chemoRT on 9/12/23.  Pt is here f/u and last ct neck and pet ct on 12/2023 unremarkable. pt still has mild hoarsen and dry mouth and alternate taste. now on regular food. wt stable around 180lbs. Pt just tooth extraction and on antibiotic now.

## 2023-12-18 NOTE — PROCEDURE
[None] : none [Flexible Endoscope] : examined with the flexible endoscope [Serial Number: ___] : Serial Number: [unfilled] [de-identified] : No lesions in the NPx, OPx, HPx or larynx.  Expected posttreatment changes, R. VC appears to have very slight movement, L. VC is mobile, airway patent.  [de-identified] : larynx SCCa

## 2023-12-20 ENCOUNTER — APPOINTMENT (OUTPATIENT)
Age: 68
End: 2023-12-20

## 2023-12-20 ENCOUNTER — RESULT REVIEW (OUTPATIENT)
Age: 68
End: 2023-12-20

## 2023-12-21 DIAGNOSIS — E86.0 DEHYDRATION: ICD-10-CM

## 2023-12-21 LAB
ALBUMIN SERPL ELPH-MCNC: 4.4 G/DL — SIGNIFICANT CHANGE UP (ref 3.3–5)
ALBUMIN SERPL ELPH-MCNC: 4.4 G/DL — SIGNIFICANT CHANGE UP (ref 3.3–5)
ALP SERPL-CCNC: 73 U/L — SIGNIFICANT CHANGE UP (ref 40–120)
ALP SERPL-CCNC: 73 U/L — SIGNIFICANT CHANGE UP (ref 40–120)
ALT FLD-CCNC: 10 U/L — SIGNIFICANT CHANGE UP (ref 10–45)
ALT FLD-CCNC: 10 U/L — SIGNIFICANT CHANGE UP (ref 10–45)
ANION GAP SERPL CALC-SCNC: 16 MMOL/L — SIGNIFICANT CHANGE UP (ref 5–17)
ANION GAP SERPL CALC-SCNC: 16 MMOL/L — SIGNIFICANT CHANGE UP (ref 5–17)
AST SERPL-CCNC: 24 U/L — SIGNIFICANT CHANGE UP (ref 10–40)
AST SERPL-CCNC: 24 U/L — SIGNIFICANT CHANGE UP (ref 10–40)
BILIRUB SERPL-MCNC: 0.2 MG/DL — SIGNIFICANT CHANGE UP (ref 0.2–1.2)
BILIRUB SERPL-MCNC: 0.2 MG/DL — SIGNIFICANT CHANGE UP (ref 0.2–1.2)
BUN SERPL-MCNC: 21 MG/DL — SIGNIFICANT CHANGE UP (ref 7–23)
BUN SERPL-MCNC: 21 MG/DL — SIGNIFICANT CHANGE UP (ref 7–23)
CALCIUM SERPL-MCNC: 9.2 MG/DL — SIGNIFICANT CHANGE UP (ref 8.4–10.5)
CALCIUM SERPL-MCNC: 9.2 MG/DL — SIGNIFICANT CHANGE UP (ref 8.4–10.5)
CHLORIDE SERPL-SCNC: 98 MMOL/L — SIGNIFICANT CHANGE UP (ref 96–108)
CHLORIDE SERPL-SCNC: 98 MMOL/L — SIGNIFICANT CHANGE UP (ref 96–108)
CO2 SERPL-SCNC: 23 MMOL/L — SIGNIFICANT CHANGE UP (ref 22–31)
CO2 SERPL-SCNC: 23 MMOL/L — SIGNIFICANT CHANGE UP (ref 22–31)
CREAT SERPL-MCNC: 1.72 MG/DL — HIGH (ref 0.5–1.3)
CREAT SERPL-MCNC: 1.72 MG/DL — HIGH (ref 0.5–1.3)
EGFR: 43 ML/MIN/1.73M2 — LOW
EGFR: 43 ML/MIN/1.73M2 — LOW
GLUCOSE SERPL-MCNC: 77 MG/DL — SIGNIFICANT CHANGE UP (ref 70–99)
GLUCOSE SERPL-MCNC: 77 MG/DL — SIGNIFICANT CHANGE UP (ref 70–99)
POTASSIUM SERPL-MCNC: 4.1 MMOL/L — SIGNIFICANT CHANGE UP (ref 3.5–5.3)
POTASSIUM SERPL-MCNC: 4.1 MMOL/L — SIGNIFICANT CHANGE UP (ref 3.5–5.3)
POTASSIUM SERPL-SCNC: 4.1 MMOL/L — SIGNIFICANT CHANGE UP (ref 3.5–5.3)
POTASSIUM SERPL-SCNC: 4.1 MMOL/L — SIGNIFICANT CHANGE UP (ref 3.5–5.3)
PROT SERPL-MCNC: 7.2 G/DL — SIGNIFICANT CHANGE UP (ref 6–8.3)
PROT SERPL-MCNC: 7.2 G/DL — SIGNIFICANT CHANGE UP (ref 6–8.3)
SODIUM SERPL-SCNC: 137 MMOL/L — SIGNIFICANT CHANGE UP (ref 135–145)
SODIUM SERPL-SCNC: 137 MMOL/L — SIGNIFICANT CHANGE UP (ref 135–145)

## 2024-01-23 ENCOUNTER — APPOINTMENT (OUTPATIENT)
Dept: RADIATION ONCOLOGY | Facility: CLINIC | Age: 69
End: 2024-01-23
Payer: MEDICARE

## 2024-01-23 VITALS
SYSTOLIC BLOOD PRESSURE: 156 MMHG | WEIGHT: 179 LBS | OXYGEN SATURATION: 99 % | HEART RATE: 59 BPM | DIASTOLIC BLOOD PRESSURE: 88 MMHG | RESPIRATION RATE: 16 BRPM | HEIGHT: 72 IN | BODY MASS INDEX: 24.24 KG/M2

## 2024-01-23 PROCEDURE — 99213 OFFICE O/P EST LOW 20 MIN: CPT

## 2024-01-23 NOTE — LETTER CLOSING
[Sincerely yours,] : Sincerely yours, [FreeTextEntry3] : Pj Lovett MD Physician in Chief Department of Radiation Medicine [facility]   of Radiation Medicine [employer] at  [facility]  Radiation  [facility]/ [facility] at [place] [address]  Tel: [phone] Fax: [phone]

## 2024-01-23 NOTE — LETTER GREETING
[Dear Doctor] : Dear Doctor, [Follow-Up] : Your patient, [unfilled] was seen in my office today for follow-up [Please see my note below.] : Please see my note below. [FreeTextEntry2] : MD Katherine Logan MD

## 2024-01-23 NOTE — PHYSICAL EXAM
[Normal] : oriented to person, place and time, the affect was normal, the mood was normal and not anxious [de-identified] : unremarkable oral exam and mirror exam.

## 2024-01-23 NOTE — REVIEW OF SYSTEMS
[Recent Change In Weight] : ~T no recent weight change [Dysphagia] : dysphagia [Hoarseness] : hoarseness [Negative] : Allergic/Immunologic [FreeTextEntry4] : had 2 teeth extracted in Dec 2023 , right  3rd lower molar and right 1st upper molar.

## 2024-01-23 NOTE — DISEASE MANAGEMENT
[Clinical] : TNM Stage: c [IV] : IV [FreeTextEntry4] : squamous cell carcinoma [NTNM] : 2 [TTNM] : 2 [MTNM] : 0 [de-identified] : 7000 cGy [de-identified] : glottis

## 2024-01-23 NOTE — PHYSICAL EXAM
[Normal] : oriented to person, place and time, the affect was normal, the mood was normal and not anxious [de-identified] : unremarkable oral exam and mirror exam.

## 2024-01-23 NOTE — LETTER CLOSING
[Sincerely yours,] : Sincerely yours, [FreeTextEntry3] : Pj Lovett MD Physician in Chief Department of Radiation Medicine [facility]   of Radiation Medicine [employer] at  [facility]  Radiation  [facility]/ [facility] at [place] [address]  Tel: [phone] Fax: [phone]

## 2024-01-23 NOTE — ASSESSMENT
[No evidence of disease] : No evidence of disease [FreeTextEntry1] : steadily  resolving treatment related sequelae

## 2024-01-23 NOTE — REASON FOR VISIT
[Routine Follow-Up] : routine follow-up visit for [Head and Neck Cancer] : head and neck cancer [Spouse] : spouse [Family Member] : family member

## 2024-01-23 NOTE — DISEASE MANAGEMENT
[Clinical] : TNM Stage: c [IV] : IV [FreeTextEntry4] : squamous cell carcinoma [TTNM] : 2 [NTNM] : 2 [MTNM] : 0 [de-identified] : 7000 cGy [de-identified] : glottis

## 2024-01-23 NOTE — HISTORY OF PRESENT ILLNESS
[FreeTextEntry1] : This 68-year-old male presents for post-treatment evaluation.  Mr. 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 He initially presented with minimal associated symptoms of hoarseness.  He completed definitive chemoradiation on 9/12/2023.  Mr. Ureña also completed concurrent chemotherapy at the direction of Dr. Walker.  Today Mr. Ureña reports feeling well.   Reports he is eating and drinking all desired foods, however, he must take copious amounts of water with solids.   Denies dysphagia and odynophagia. Oral cavity clean without erythema. Denies pain.

## 2024-03-06 ENCOUNTER — OUTPATIENT (OUTPATIENT)
Dept: OUTPATIENT SERVICES | Facility: HOSPITAL | Age: 69
LOS: 1 days | Discharge: HOME | End: 2024-03-06

## 2024-03-06 DIAGNOSIS — C32.0 MALIGNANT NEOPLASM OF GLOTTIS: ICD-10-CM

## 2024-03-06 DIAGNOSIS — Z98.890 OTHER SPECIFIED POSTPROCEDURAL STATES: Chronic | ICD-10-CM

## 2024-03-12 ENCOUNTER — RESULT REVIEW (OUTPATIENT)
Age: 69
End: 2024-03-12

## 2024-03-12 ENCOUNTER — APPOINTMENT (OUTPATIENT)
Dept: HEMATOLOGY ONCOLOGY | Facility: CLINIC | Age: 69
End: 2024-03-12
Payer: MEDICARE

## 2024-03-12 VITALS
WEIGHT: 180 LBS | TEMPERATURE: 97.6 F | OXYGEN SATURATION: 100 % | DIASTOLIC BLOOD PRESSURE: 89 MMHG | HEART RATE: 61 BPM | BODY MASS INDEX: 24.38 KG/M2 | SYSTOLIC BLOOD PRESSURE: 131 MMHG | HEIGHT: 72 IN

## 2024-03-12 LAB
BASOPHILS # BLD AUTO: 0.1 K/UL — SIGNIFICANT CHANGE UP (ref 0–0.2)
BASOPHILS NFR BLD AUTO: 2.1 % — HIGH (ref 0–2)
EOSINOPHIL # BLD AUTO: 0.2 K/UL — SIGNIFICANT CHANGE UP (ref 0–0.5)
EOSINOPHIL NFR BLD AUTO: 3.6 % — SIGNIFICANT CHANGE UP (ref 0–6)
HCT VFR BLD CALC: 32.5 % — LOW (ref 39–50)
HGB BLD-MCNC: 11.1 G/DL — LOW (ref 13–17)
LYMPHOCYTES # BLD AUTO: 0.7 K/UL — LOW (ref 1–3.3)
LYMPHOCYTES # BLD AUTO: 12.7 % — LOW (ref 13–44)
MCHC RBC-ENTMCNC: 33.6 PG — SIGNIFICANT CHANGE UP (ref 27–34)
MCHC RBC-ENTMCNC: 34.1 G/DL — SIGNIFICANT CHANGE UP (ref 32–36)
MCV RBC AUTO: 98.5 FL — SIGNIFICANT CHANGE UP (ref 80–100)
MONOCYTES # BLD AUTO: 0.5 K/UL — SIGNIFICANT CHANGE UP (ref 0–0.9)
MONOCYTES NFR BLD AUTO: 8.6 % — SIGNIFICANT CHANGE UP (ref 2–14)
NEUTROPHILS # BLD AUTO: 4 K/UL — SIGNIFICANT CHANGE UP (ref 1.8–7.4)
NEUTROPHILS NFR BLD AUTO: 73 % — SIGNIFICANT CHANGE UP (ref 43–77)
PLATELET # BLD AUTO: 257 K/UL — SIGNIFICANT CHANGE UP (ref 150–400)
RBC # BLD: 3.3 M/UL — LOW (ref 4.2–5.8)
RBC # FLD: 13.4 % — SIGNIFICANT CHANGE UP (ref 10.3–14.5)
WBC # BLD: 5.5 K/UL — SIGNIFICANT CHANGE UP (ref 3.8–10.5)
WBC # FLD AUTO: 5.5 K/UL — SIGNIFICANT CHANGE UP (ref 3.8–10.5)

## 2024-03-12 PROCEDURE — 99214 OFFICE O/P EST MOD 30 MIN: CPT

## 2024-03-13 LAB
ALBUMIN SERPL ELPH-MCNC: 4.4 G/DL
ALP BLD-CCNC: 75 U/L
ALT SERPL-CCNC: 7 U/L
ANION GAP SERPL CALC-SCNC: 10 MMOL/L
AST SERPL-CCNC: 14 U/L
BILIRUB SERPL-MCNC: 0.4 MG/DL
BUN SERPL-MCNC: 21 MG/DL
CALCIUM SERPL-MCNC: 9.3 MG/DL
CHLORIDE SERPL-SCNC: 101 MMOL/L
CO2 SERPL-SCNC: 30 MMOL/L
CREAT SERPL-MCNC: 1.68 MG/DL
EGFR: 44 ML/MIN/1.73M2
GLUCOSE SERPL-MCNC: 92 MG/DL
MAGNESIUM SERPL-MCNC: 2.2 MG/DL
POTASSIUM SERPL-SCNC: 4.6 MMOL/L
PROT SERPL-MCNC: 6.8 G/DL
SODIUM SERPL-SCNC: 141 MMOL/L

## 2024-03-25 ENCOUNTER — APPOINTMENT (OUTPATIENT)
Dept: OTOLARYNGOLOGY | Facility: CLINIC | Age: 69
End: 2024-03-25
Payer: MEDICARE

## 2024-03-25 PROCEDURE — 99214 OFFICE O/P EST MOD 30 MIN: CPT | Mod: 25

## 2024-03-25 PROCEDURE — 31575 DIAGNOSTIC LARYNGOSCOPY: CPT

## 2024-03-25 RX ORDER — IBUPROFEN 600 MG/1
600 TABLET, FILM COATED ORAL
Qty: 30 | Refills: 0 | Status: COMPLETED | COMMUNITY
Start: 2024-03-05

## 2024-03-25 RX ORDER — SILVER SULFADIAZINE 10 MG/G
1 CREAM TOPICAL
Qty: 1 | Refills: 2 | Status: COMPLETED | COMMUNITY
Start: 2023-08-28 | End: 2024-03-25

## 2024-03-25 RX ORDER — LEVOFLOXACIN 500 MG/1
500 TABLET, FILM COATED ORAL
Qty: 7 | Refills: 0 | Status: COMPLETED | COMMUNITY
Start: 2024-01-09

## 2024-03-25 RX ORDER — LIDOCAINE HYDROCHLORIDE 20 MG/ML
2 SOLUTION ORAL; TOPICAL
Qty: 580 | Refills: 2 | Status: COMPLETED | COMMUNITY
Start: 2023-08-08 | End: 2024-03-25

## 2024-03-25 RX ORDER — NYSTATIN AND TRIAMCINOLONE ACETONIDE 100000; 1 [USP'U]/G; MG/G
100000-0.1 OINTMENT TOPICAL
Qty: 60 | Refills: 0 | Status: COMPLETED | COMMUNITY
Start: 2023-12-05

## 2024-03-25 RX ORDER — LEVOTHYROXINE SODIUM 0.05 MG/1
50 TABLET ORAL
Qty: 60 | Refills: 0 | Status: ACTIVE | COMMUNITY
Start: 2024-01-23

## 2024-03-25 RX ORDER — LEVOCETIRIZINE DIHYDROCHLORIDE 5 MG/1
5 TABLET ORAL
Qty: 60 | Refills: 0 | Status: COMPLETED | COMMUNITY
Start: 2024-01-23

## 2024-03-25 RX ORDER — AMOXICILLIN 500 MG/1
500 CAPSULE ORAL
Qty: 21 | Refills: 0 | Status: COMPLETED | COMMUNITY
Start: 2024-03-05

## 2024-03-25 NOTE — PHYSICAL EXAM
[Normal] : no rashes [Laryngoscopy Performed] : laryngoscopy was performed, see procedure section for findings [de-identified] : Posttreatment changes, no LAD. [FreeTextEntry1] : No concerning lesions in the OC/OPx on inspection or palpation.

## 2024-03-25 NOTE — PROCEDURE
[None] : none [Serial Number: ___] : Serial Number: [unfilled] [Flexible Endoscope] : examined with the flexible endoscope [de-identified] : No lesions in the NPx, OPx, HPx or larynx.  Expected posttreatment changes, R. VC appears to have very slight movement, L. VC is mobile, airway patent.  [de-identified] : larynx SCCa

## 2024-03-25 NOTE — HISTORY OF PRESENT ILLNESS
[de-identified] : 68 year old male following up for vocal cord lesion. History of smoking 2-3 cig.day recently, before 5-6/day for 50 yrs.  no etoh. s/p Direct laryngoscopy with biopsy with biopsy, esophagoscopy 6/1/23 showed Squamous cell carcinoma, well differentiated. completed chemoRT with Dr. Lovett and Dr. Morales on 9/12/23. Last ct neck and pet ct on 12/2023 unremarkable.  Pt is here f/u and hoarseness stable and dry mouth and taste bud are improving. Now on regular food. wt stable around 180lbs.

## 2024-06-03 ENCOUNTER — APPOINTMENT (OUTPATIENT)
Dept: CT IMAGING | Facility: CLINIC | Age: 69
End: 2024-06-03
Payer: MEDICARE

## 2024-06-03 ENCOUNTER — OUTPATIENT (OUTPATIENT)
Dept: OUTPATIENT SERVICES | Facility: HOSPITAL | Age: 69
LOS: 1 days | End: 2024-06-03
Payer: MEDICARE

## 2024-06-03 DIAGNOSIS — Z00.8 ENCOUNTER FOR OTHER GENERAL EXAMINATION: ICD-10-CM

## 2024-06-03 DIAGNOSIS — C32.9 MALIGNANT NEOPLASM OF LARYNX, UNSPECIFIED: ICD-10-CM

## 2024-06-03 DIAGNOSIS — Z98.890 OTHER SPECIFIED POSTPROCEDURAL STATES: Chronic | ICD-10-CM

## 2024-06-03 PROCEDURE — 71260 CT THORAX DX C+: CPT | Mod: 26

## 2024-06-03 PROCEDURE — 70491 CT SOFT TISSUE NECK W/DYE: CPT | Mod: 26

## 2024-06-03 PROCEDURE — 71260 CT THORAX DX C+: CPT

## 2024-06-03 PROCEDURE — 70491 CT SOFT TISSUE NECK W/DYE: CPT

## 2024-06-03 NOTE — HISTORY OF PRESENT ILLNESS
[de-identified] : LISSET RUBALCAVA is a 68 year M with PMHX of HTN?, HLD?, current smoker (2-3 cig a day recently, before 5-6/day for 50 yrs)  presents for initial consultation for laryngeal cancer.\par  \par  Patient was noted to have hoarseness for a month. A CT neck done 5/5/2023 showed asymmetric thickening and medialization of the right true vocal fold relative to the left side. No suspicious cervical adenopathy.\par  \par  Patient evaluated by Dr. Correa on 5/22/23. Laryngoscopy: No lesions in the NPx, OPx, HPx. Exam of the larynx with mass involving the R. VC which is paralyzed, L. VC is mobile, patient is intubatable.\par  \par  On 6/1/2023,  he underwent a biopsy of a right vocal cord laryngeal lesion. This revealed squamous cell carcinoma, well differentiated.\par  \par  PEt/Ct on 6/13 reports a hypermetabolic soft tissue mass centered on the anterior and anteromedial RIGHT true vocal cord with extension across the midline . \par  Hypermetabolic nodes are present BILATERALLY. Reference examples include:\par  Left level 2A (1.0 x 0.7 cm, SUV 3.7,)\par  Left level IIb (0.6 x 0.7 cm, SUV 4.3,)\par  Ill-defined focus posterolateral to RIGHT tongue base (SUV 4.6,)\par  Right supraclavicular (0.9 x 0.7 cm, SUV 4.8, and 0.7 x 0.6 cm, SUV 4.6,)\par  Ill-defined node at the RIGHT thoracic inlet inferior to the RIGHT lobe of the thyroid (SUV 6.9)\par  \par  IMPRESSION:\par  1.  Hypermetabolic RIGHT laryngeal tumor with extension across the midline.\par  2.  Findings suspicious for multiple BILATERAL lymph nodes with increased uptake where metastatic disease is not excluded. These are seen in the neck and neck bases.\par  3.  Proximal gastric uptake, nonspecific. Correlate clinically.\par  4.  Increased uptake at distal RIGHT colon, which may be physiologic or inflammatory.\par  \par  \par  \par  \par   [de-identified] : Patient presents for follow up with son Marv and wife  Patient scheduled to start RT 7/21/23 and start weekly Cisplatin on 7/24/23 Patient s/p C6 on 8/28/23. Completed RT on 9/12/23 Reports recent colonoscopy, 1 polyp removal everything normal per patient.  Reports Dry mouth Admits Appetite/ energy improving  States his last cigarette was last month  He is retired.    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

## 2024-06-03 NOTE — ASSESSMENT
[FreeTextEntry1] : LISSET RUBALCAVA is a 68 year M with PMHX of HTN?, HLD?, current smoker (2-3 cig a day recently, before 5-6/day for 50 yrs) presents for initial consultation for Advanced/very advanced head and neck carcinoma possibly T4 at least N2 Squamous cell carcinoma of larynx.  Patient evaluated by Dr. Correa on 5/22/23. Laryngoscopy: No lesions in the NPx, OPx, HPx. Exam of the larynx with mass involving the R. VC which is paralyzed, L. VC is mobile, patient is intuitable.  -concurrent chemo RT discussed with patient weekly Cisplatin. Creatinine as of 5/20/23 at 1.16. -Baseline hearing test done on 6/21/23 -Dental clearance -Creatinine at 1.72 on 8/10/23, advised patient to increase fluid intake. -Rx sent for Magnesium if magnesium trends low, will advise to take -s/p screening colonoscopy and endoscopy, WNL per patients son -Patient started RT on 7/21/23 and start weekly Cisplatin on 7/24/23, completed RT on 9/12/23 - C6 CISPLATIN  given on 8/28/23 -CT NECK 12/11/23 : Stable interval follow-up CT examination without evidence of neoplastic disease progression nor recurrence. No new or enlarging cervical lymph nodes. - f/u with DR Correa  - Ordered CMP/Mg/CBC  - F/U in 3 months 
I will START or STAY ON the medications listed below when I get home from the hospital:    Zofran ODT 4 mg oral tablet, disintegrating  -- 1 tab(s) by mouth 3 times a day   -- Indication: For Cyclic vomiting syndrome    Protonix 40 mg oral delayed release tablet  -- 1 tab(s) by mouth 2 times a day  -- Indication: For Gastritis

## 2024-06-11 ENCOUNTER — RESULT REVIEW (OUTPATIENT)
Age: 69
End: 2024-06-11

## 2024-06-11 ENCOUNTER — LABORATORY RESULT (OUTPATIENT)
Age: 69
End: 2024-06-11

## 2024-06-11 ENCOUNTER — APPOINTMENT (OUTPATIENT)
Dept: HEMATOLOGY ONCOLOGY | Facility: CLINIC | Age: 69
End: 2024-06-11
Payer: MEDICARE

## 2024-06-11 VITALS
HEART RATE: 52 BPM | WEIGHT: 179.03 LBS | OXYGEN SATURATION: 100 % | DIASTOLIC BLOOD PRESSURE: 80 MMHG | SYSTOLIC BLOOD PRESSURE: 147 MMHG | BODY MASS INDEX: 24.25 KG/M2 | HEIGHT: 72 IN

## 2024-06-11 DIAGNOSIS — C32.0 MALIGNANT NEOPLASM OF GLOTTIS: ICD-10-CM

## 2024-06-11 PROCEDURE — 99215 OFFICE O/P EST HI 40 MIN: CPT

## 2024-06-11 NOTE — HISTORY OF PRESENT ILLNESS
[de-identified] : LISSET RUBALCAVA is a 68 year M with PMHX of HTN?, HLD?, current smoker (2-3 cig a day recently, before 5-6/day for 50 yrs)  presents for initial consultation for laryngeal cancer.\par  \par  Patient was noted to have hoarseness for a month. A CT neck done 5/5/2023 showed asymmetric thickening and medialization of the right true vocal fold relative to the left side. No suspicious cervical adenopathy.\par  \par  Patient evaluated by Dr. Correa on 5/22/23. Laryngoscopy: No lesions in the NPx, OPx, HPx. Exam of the larynx with mass involving the R. VC which is paralyzed, L. VC is mobile, patient is intubatable.\par  \par  On 6/1/2023,  he underwent a biopsy of a right vocal cord laryngeal lesion. This revealed squamous cell carcinoma, well differentiated.\par  \par  PEt/Ct on 6/13 reports a hypermetabolic soft tissue mass centered on the anterior and anteromedial RIGHT true vocal cord with extension across the midline . \par  Hypermetabolic nodes are present BILATERALLY. Reference examples include:\par  Left level 2A (1.0 x 0.7 cm, SUV 3.7,)\par  Left level IIb (0.6 x 0.7 cm, SUV 4.3,)\par  Ill-defined focus posterolateral to RIGHT tongue base (SUV 4.6,)\par  Right supraclavicular (0.9 x 0.7 cm, SUV 4.8, and 0.7 x 0.6 cm, SUV 4.6,)\par  Ill-defined node at the RIGHT thoracic inlet inferior to the RIGHT lobe of the thyroid (SUV 6.9)\par  \par  IMPRESSION:\par  1.  Hypermetabolic RIGHT laryngeal tumor with extension across the midline.\par  2.  Findings suspicious for multiple BILATERAL lymph nodes with increased uptake where metastatic disease is not excluded. These are seen in the neck and neck bases.\par  3.  Proximal gastric uptake, nonspecific. Correlate clinically.\par  4.  Increased uptake at distal RIGHT colon, which may be physiologic or inflammatory.\par  \par  \par  \par  \par   [de-identified] : Patient presents for follow up with son Marv and wife  Patient scheduled to start RT 7/21/23 and start weekly Cisplatin on 7/24/23 Patient s/p C6 on 8/28/23. Completed RT on 9/12/23 Reports recent colonoscopy, 1 polyp removal everything normal per patient.   Still smoking intermittently Repeat CT NECK on 6/3/24 stable interval follow-up CT examination without evidence of neoplastic disease progression nor recurrence. No new or enlarging cervical lymph nodes.   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

## 2024-06-11 NOTE — CONSULT LETTER
[Dear  ___] : Dear  [unfilled], [Sincerely,] : Sincerely, [FreeTextEntry3] : Katherine Morales MD [facility] Hematologist/ Medical Oncologist [address] [place] [address]  [phone](ph)  [phone] (fax)

## 2024-06-11 NOTE — ASSESSMENT
[FreeTextEntry1] : LISSET RUBALCAVA is a 68 year M with PMHX of HTN?, HLD?, current smoker (2-3 cig a day recently, before 5-6/day for 50 yrs) presents for initial consultation for Advanced/very advanced head and neck carcinoma possibly T4 at least N2 Squamous cell carcinoma of larynx.  Patient evaluated by Dr. Correa on 5/22/23. Laryngoscopy: No lesions in the NPx, OPx, HPx. Exam of the larynx with mass involving the R. VC which is paralyzed, L. VC is mobile, patient is intuitable.  -concurrent chemo RT discussed with patient weekly Cisplatin. Creatinine as of 5/20/23 at 1.16. -Baseline hearing test done on 6/21/23 -Dental clearance -Creatinine at 1.72 on 8/10/23, advised patient to increase fluid intake. -Rx sent for Magnesium if magnesium trends low, will advise to take -s/p screening colonoscopy and endoscopy, WNL per patients son -Patient started RT on 7/21/23 and start weekly Cisplatin on 7/24/23, completed RT on 9/12/23 - C6 CISPLATIN  given on 8/28/23 -CT NECK 12/11/23 : Stable interval follow-up CT examination without evidence of neoplastic disease progression nor recurrence. No new or enlarging cervical lymph nodes. CT NECK on 6/3/24 stable interval follow-up CT examination without evidence of neoplastic disease progression nor recurrence. No new or enlarging cervical lymph nodes. - f/u with DR Correa in June 2024 and Dr Lovett - f/u with jeovany in 3months - f./u with me in 6 months [AdvancecareDate] : 06/11/2024

## 2024-06-12 LAB — MAGNESIUM SERPL-MCNC: 2.1 MG/DL

## 2024-06-14 LAB
ALBUMIN SERPL ELPH-MCNC: 4.4 G/DL
ALP BLD-CCNC: 64 U/L
ALT SERPL-CCNC: 12 U/L
ANION GAP SERPL CALC-SCNC: 15 MMOL/L
AST SERPL-CCNC: 17 U/L
BILIRUB SERPL-MCNC: 0.3 MG/DL
BUN SERPL-MCNC: 18 MG/DL
CALCIUM SERPL-MCNC: 9.1 MG/DL
CHLORIDE SERPL-SCNC: 103 MMOL/L
CO2 SERPL-SCNC: 22 MMOL/L
CREAT SERPL-MCNC: 1.83 MG/DL
EGFR: 39 ML/MIN/1.73M2
GLUCOSE SERPL-MCNC: 89 MG/DL
POTASSIUM SERPL-SCNC: 4.2 MMOL/L
PROT SERPL-MCNC: 6.8 G/DL
SODIUM SERPL-SCNC: 140 MMOL/L
TSH SERPL-ACNC: 38.6 UIU/ML

## 2024-06-18 ENCOUNTER — APPOINTMENT (OUTPATIENT)
Dept: RADIATION ONCOLOGY | Facility: CLINIC | Age: 69
End: 2024-06-18
Payer: MEDICARE

## 2024-06-18 VITALS
SYSTOLIC BLOOD PRESSURE: 161 MMHG | DIASTOLIC BLOOD PRESSURE: 79 MMHG | RESPIRATION RATE: 16 BRPM | WEIGHT: 181 LBS | HEART RATE: 58 BPM | OXYGEN SATURATION: 98 % | BODY MASS INDEX: 24.55 KG/M2

## 2024-06-18 DIAGNOSIS — Z92.3 PERSONAL HISTORY OF IRRADIATION: ICD-10-CM

## 2024-06-18 PROCEDURE — G2211 COMPLEX E/M VISIT ADD ON: CPT

## 2024-06-18 PROCEDURE — 99213 OFFICE O/P EST LOW 20 MIN: CPT

## 2024-06-21 PROBLEM — Z92.3 HISTORY OF RADIATION TO HEAD AND NECK REGION: Status: ACTIVE | Noted: 2024-01-23

## 2024-06-21 NOTE — LETTER CLOSING
[Sincerely yours,] : Sincerely yours, [FreeTextEntry3] : Pj Lovett MD Physician in Chief Department of Radiation Medicine [facility]   of Radiation Medicine [employer] at  hospitals/[facility]  Radiation  [facility]/ [facility] at [place] [address]  Tel: [phone] Fax: [phone]

## 2024-06-21 NOTE — PHYSICAL EXAM
[Normal] : oriented to person, place and time, the affect was normal, the mood was normal and not anxious [de-identified] : unremarkable oral exam and mirror exam.; hoarseness

## 2024-06-21 NOTE — DATA REVIEWED
[FreeTextEntry1] : CT Chest &  Neck soft tissue 6/3/2024 IMPRESSION: Stable interval follow-up CT examination without evidence of neoplastic disease progression nor recurrence. No new or enlarging cervical lymph nodes.  IMPRESSION:  Nonspecific bilateral micronodules. Recommend attention on follow-up.

## 2024-06-21 NOTE — DISEASE MANAGEMENT
[Clinical] : TNM Stage: c [IV] : IV [FreeTextEntry4] : squamous cell carcinoma [TTNM] : 2 [NTNM] : 2 [MTNM] : 0 [de-identified] : 7000 cGy [de-identified] : glottis

## 2024-06-21 NOTE — VITALS
[Maximal Pain Intensity: 0/10] : 0/10 [Least Pain Intensity: 0/10] : 0/10 [NoTreatment Scheduled] : no treatment scheduled [90: Able to carry normal activity; minor signs or symptoms of disease.] : 90: Able to carry normal activity; minor signs or symptoms of disease.  [ECOG Performance Status: 1 - Restricted in physically strenuous activity but ambulatory and able to carry out work of a light or sedentary nature] : Performance Status: 1 - Restricted in physically strenuous activity but ambulatory and able to carry out work of a light or sedentary nature, e.g., light house work, office work [80: Normal activity with effort; some signs or symptoms of disease.] : 80: Normal activity with effort; some signs or symptoms of disease.

## 2024-06-21 NOTE — REVIEW OF SYSTEMS
[Dysphagia] : dysphagia [Hoarseness] : hoarseness [Negative] : Allergic/Immunologic [Recent Change In Weight] : ~T no recent weight change [FreeTextEntry4] : had 2 teeth extracted in Dec 2023 , right  3rd lower molar and right 1st upper molar.

## 2024-06-21 NOTE — HISTORY OF PRESENT ILLNESS
[FreeTextEntry1] : This 69-year-old male presents for routine follow-up.  Mr. 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 He initially presented with minimal associated symptoms of hoarseness.  He completed definitive chemoradiation on 9/12/2023.  Mr. Ureña also completed concurrent chemotherapy at the direction of Dr. Walker.  Today Mr. Ureña reports feeling well.   Reports he is eating and drinking all desired foods, while continuing to take water with solids.   Denies dysphagia and odynophagia. Oral cavity clean without erythema. Denies pain.

## 2024-06-24 ENCOUNTER — APPOINTMENT (OUTPATIENT)
Dept: OTOLARYNGOLOGY | Facility: CLINIC | Age: 69
End: 2024-06-24
Payer: MEDICARE

## 2024-06-24 DIAGNOSIS — R49.0 DYSPHONIA: ICD-10-CM

## 2024-06-24 DIAGNOSIS — R13.10 DYSPHAGIA, UNSPECIFIED: ICD-10-CM

## 2024-06-24 DIAGNOSIS — C32.9 MALIGNANT NEOPLASM OF LARYNX, UNSPECIFIED: ICD-10-CM

## 2024-06-24 DIAGNOSIS — I89.0 LYMPHEDEMA, NOT ELSEWHERE CLASSIFIED: ICD-10-CM

## 2024-06-24 PROCEDURE — 31575 DIAGNOSTIC LARYNGOSCOPY: CPT

## 2024-06-24 PROCEDURE — 99214 OFFICE O/P EST MOD 30 MIN: CPT | Mod: 25

## 2024-09-06 ENCOUNTER — OUTPATIENT (OUTPATIENT)
Dept: OUTPATIENT SERVICES | Facility: HOSPITAL | Age: 69
LOS: 1 days | Discharge: HOME | End: 2024-09-06

## 2024-09-06 DIAGNOSIS — Z98.890 OTHER SPECIFIED POSTPROCEDURAL STATES: Chronic | ICD-10-CM

## 2024-09-06 DIAGNOSIS — C32.0 MALIGNANT NEOPLASM OF GLOTTIS: ICD-10-CM

## 2024-09-17 ENCOUNTER — APPOINTMENT (OUTPATIENT)
Dept: HEMATOLOGY ONCOLOGY | Facility: CLINIC | Age: 69
End: 2024-09-17
Payer: MEDICARE

## 2024-09-17 ENCOUNTER — RESULT REVIEW (OUTPATIENT)
Age: 69
End: 2024-09-17

## 2024-09-17 ENCOUNTER — LABORATORY RESULT (OUTPATIENT)
Age: 69
End: 2024-09-17

## 2024-09-17 VITALS
OXYGEN SATURATION: 99 % | DIASTOLIC BLOOD PRESSURE: 84 MMHG | SYSTOLIC BLOOD PRESSURE: 158 MMHG | HEART RATE: 50 BPM | TEMPERATURE: 97.8 F | WEIGHT: 179.68 LBS | BODY MASS INDEX: 20.79 KG/M2 | HEIGHT: 78 IN

## 2024-09-17 DIAGNOSIS — C32.0 MALIGNANT NEOPLASM OF GLOTTIS: ICD-10-CM

## 2024-09-17 LAB
BASOPHILS # BLD AUTO: 0.1 K/UL — SIGNIFICANT CHANGE UP (ref 0–0.2)
BASOPHILS NFR BLD AUTO: 1.6 % — SIGNIFICANT CHANGE UP (ref 0–2)
EOSINOPHIL # BLD AUTO: 0.1 K/UL — SIGNIFICANT CHANGE UP (ref 0–0.5)
EOSINOPHIL NFR BLD AUTO: 2.2 % — SIGNIFICANT CHANGE UP (ref 0–6)
HCT VFR BLD CALC: 34.3 % — LOW (ref 39–50)
HGB BLD-MCNC: 11.3 G/DL — LOW (ref 13–17)
LYMPHOCYTES # BLD AUTO: 0.5 K/UL — LOW (ref 1–3.3)
LYMPHOCYTES # BLD AUTO: 9.9 % — LOW (ref 13–44)
MCHC RBC-ENTMCNC: 32.8 G/DL — SIGNIFICANT CHANGE UP (ref 32–36)
MCHC RBC-ENTMCNC: 33.5 PG — SIGNIFICANT CHANGE UP (ref 27–34)
MCV RBC AUTO: 102 FL — HIGH (ref 80–100)
MONOCYTES # BLD AUTO: 0.4 K/UL — SIGNIFICANT CHANGE UP (ref 0–0.9)
MONOCYTES NFR BLD AUTO: 8.6 % — SIGNIFICANT CHANGE UP (ref 2–14)
NEUTROPHILS # BLD AUTO: 3.8 K/UL — SIGNIFICANT CHANGE UP (ref 1.8–7.4)
NEUTROPHILS NFR BLD AUTO: 77.7 % — HIGH (ref 43–77)
PLATELET # BLD AUTO: 253 K/UL — SIGNIFICANT CHANGE UP (ref 150–400)
RBC # BLD: 3.36 M/UL — LOW (ref 4.2–5.8)
RBC # FLD: 13.2 % — SIGNIFICANT CHANGE UP (ref 10.3–14.5)
WBC # BLD: 4.9 K/UL — SIGNIFICANT CHANGE UP (ref 3.8–10.5)
WBC # FLD AUTO: 4.9 K/UL — SIGNIFICANT CHANGE UP (ref 3.8–10.5)

## 2024-09-17 PROCEDURE — 99214 OFFICE O/P EST MOD 30 MIN: CPT

## 2024-09-17 NOTE — HISTORY OF PRESENT ILLNESS
[de-identified] : LISSET RUBALCAVA is a 68 year M with PMHX of HTN?, HLD?, current smoker (2-3 cig a day recently, before 5-6/day for 50 yrs)  presents for initial consultation for laryngeal cancer.\par  \par  Patient was noted to have hoarseness for a month. A CT neck done 5/5/2023 showed asymmetric thickening and medialization of the right true vocal fold relative to the left side. No suspicious cervical adenopathy.\par  \par  Patient evaluated by Dr. Correa on 5/22/23. Laryngoscopy: No lesions in the NPx, OPx, HPx. Exam of the larynx with mass involving the R. VC which is paralyzed, L. VC is mobile, patient is intubatable.\par  \par  On 6/1/2023,  he underwent a biopsy of a right vocal cord laryngeal lesion. This revealed squamous cell carcinoma, well differentiated.\par  \par  PEt/Ct on 6/13 reports a hypermetabolic soft tissue mass centered on the anterior and anteromedial RIGHT true vocal cord with extension across the midline . \par  Hypermetabolic nodes are present BILATERALLY. Reference examples include:\par  Left level 2A (1.0 x 0.7 cm, SUV 3.7,)\par  Left level IIb (0.6 x 0.7 cm, SUV 4.3,)\par  Ill-defined focus posterolateral to RIGHT tongue base (SUV 4.6,)\par  Right supraclavicular (0.9 x 0.7 cm, SUV 4.8, and 0.7 x 0.6 cm, SUV 4.6,)\par  Ill-defined node at the RIGHT thoracic inlet inferior to the RIGHT lobe of the thyroid (SUV 6.9)\par  \par  IMPRESSION:\par  1.  Hypermetabolic RIGHT laryngeal tumor with extension across the midline.\par  2.  Findings suspicious for multiple BILATERAL lymph nodes with increased uptake where metastatic disease is not excluded. These are seen in the neck and neck bases.\par  3.  Proximal gastric uptake, nonspecific. Correlate clinically.\par  4.  Increased uptake at distal RIGHT colon, which may be physiologic or inflammatory.\par  \par  \par  \par  \par   [de-identified] : Patient presents for follow up   Patient scheduled to start RT 7/21/23 and start weekly Cisplatin on 7/24/23 Patient s/p C6 on 8/28/23. Completed RT on 9/12/23 Reports recent colonoscopy, 1 polyp removal everything normal per patient.  GI in [place] - unsure of name  Hoarseness is stable Patient able to swallow without pain, appetite is improving   Still smoking intermittently  6/24/24 Laryngoscopy: No lesions in the NPx, OPx, HPx or larynx. Expected posttreatment changes, R. VC appears to have very slight movement, L. VC is mobile, airway patent.    6/3/24 CT NECK:  stable interval follow-up CT examination without evidence of neoplastic disease progression nor recurrence. No new or enlarging cervical lymph nodes. 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

## 2024-09-17 NOTE — ASSESSMENT
[FreeTextEntry1] : LISSET RUBALCAVA is a 68 year M with PMHX of HTN?, HLD?, current smoker (2-3 cig a day recently, before 5-6/day for 50 yrs) presents for initial consultation for Advanced/very advanced head and neck carcinoma possibly T4 at least N2 Squamous cell carcinoma of larynx.  Patient evaluated by Dr. Correa on 5/22/23. Laryngoscopy: No lesions in the NPx, OPx, HPx. Exam of the larynx with mass involving the R. VC which is paralyzed, L. VC is mobile, patient is intuitable.  -concurrent chemo RT discussed with patient weekly Cisplatin. Creatinine as of 5/20/23 at 1.16. -Baseline hearing test done on 6/21/23 -Dental clearance -Creatinine at 1.72 on 8/10/23, advised patient to increase fluid intake. -Rx sent for Magnesium if magnesium trends low, will advise to take -s/p screening colonoscopy and endoscopy, WNL per patient's son -Patient started RT on 7/21/23 and start weekly Cisplatin on 7/24/23, completed RT on 9/12/23 - C6 CISPLATIN  given on 8/28/23 -CT NECK 12/11/23 : Stable interval follow-up CT examination without evidence of neoplastic disease progression nor recurrence. No new or enlarging cervical lymph nodes. CT NECK on 6/3/24 stable interval follow-up CT examination without evidence of neoplastic disease progression nor recurrence. No new or enlarging cervical lymph nodes. - Repeat imaging in 6 months  - f/u with DR Correa next week and Dr Lovett ( October 2024) - f/u in 3 months  [AdvancecareDate] : 06/11/2024

## 2024-09-18 LAB
ALBUMIN SERPL ELPH-MCNC: 4.3 G/DL
ALP BLD-CCNC: 73 U/L
ALT SERPL-CCNC: 9 U/L
ANION GAP SERPL CALC-SCNC: 10 MMOL/L
AST SERPL-CCNC: 16 U/L
BILIRUB SERPL-MCNC: 0.4 MG/DL
BUN SERPL-MCNC: 18 MG/DL
CALCIUM SERPL-MCNC: 9 MG/DL
CHLORIDE SERPL-SCNC: 104 MMOL/L
CO2 SERPL-SCNC: 27 MMOL/L
CREAT SERPL-MCNC: 1.75 MG/DL
EGFR: 42 ML/MIN/1.73M2
GLUCOSE SERPL-MCNC: 92 MG/DL
MAGNESIUM SERPL-MCNC: 2.1 MG/DL
POTASSIUM SERPL-SCNC: 4.6 MMOL/L
PROT SERPL-MCNC: 6.7 G/DL
SODIUM SERPL-SCNC: 141 MMOL/L
TSH SERPL-ACNC: 36 UIU/ML

## 2024-09-23 ENCOUNTER — APPOINTMENT (OUTPATIENT)
Dept: OTOLARYNGOLOGY | Facility: CLINIC | Age: 69
End: 2024-09-23

## 2024-10-28 ENCOUNTER — APPOINTMENT (OUTPATIENT)
Dept: OTOLARYNGOLOGY | Facility: CLINIC | Age: 69
End: 2024-10-28
Payer: MEDICARE

## 2024-10-28 VITALS
DIASTOLIC BLOOD PRESSURE: 78 MMHG | RESPIRATION RATE: 16 BRPM | OXYGEN SATURATION: 99 % | HEART RATE: 77 BPM | HEIGHT: 73 IN | BODY MASS INDEX: 23.72 KG/M2 | WEIGHT: 179 LBS | SYSTOLIC BLOOD PRESSURE: 155 MMHG

## 2024-10-28 DIAGNOSIS — R49.0 DYSPHONIA: ICD-10-CM

## 2024-10-28 DIAGNOSIS — C32.9 MALIGNANT NEOPLASM OF LARYNX, UNSPECIFIED: ICD-10-CM

## 2024-10-28 DIAGNOSIS — I89.0 LYMPHEDEMA, NOT ELSEWHERE CLASSIFIED: ICD-10-CM

## 2024-10-28 PROCEDURE — 31575 DIAGNOSTIC LARYNGOSCOPY: CPT

## 2024-10-28 PROCEDURE — 99214 OFFICE O/P EST MOD 30 MIN: CPT | Mod: 25

## 2024-10-28 RX ORDER — LEVOTHYROXINE SODIUM 0.1 MG/1
100 TABLET ORAL
Qty: 30 | Refills: 0 | Status: ACTIVE | COMMUNITY
Start: 2024-10-15

## 2024-10-28 RX ORDER — ACETAMINOPHEN AND CODEINE PHOSPHATE 300; 30 MG/1; MG/1
300-30 TABLET ORAL
Qty: 6 | Refills: 0 | Status: COMPLETED | COMMUNITY
Start: 2024-09-06

## 2024-10-29 ENCOUNTER — APPOINTMENT (OUTPATIENT)
Dept: RADIATION ONCOLOGY | Facility: CLINIC | Age: 69
End: 2024-10-29
Payer: MEDICARE

## 2024-10-29 VITALS
DIASTOLIC BLOOD PRESSURE: 85 MMHG | HEART RATE: 66 BPM | RESPIRATION RATE: 16 BRPM | OXYGEN SATURATION: 99 % | BODY MASS INDEX: 23.86 KG/M2 | WEIGHT: 180 LBS | SYSTOLIC BLOOD PRESSURE: 147 MMHG | HEIGHT: 73 IN

## 2024-10-29 DIAGNOSIS — R13.10 DYSPHAGIA, UNSPECIFIED: ICD-10-CM

## 2024-10-29 DIAGNOSIS — Z92.3 PERSONAL HISTORY OF IRRADIATION: ICD-10-CM

## 2024-10-29 DIAGNOSIS — C32.0 MALIGNANT NEOPLASM OF GLOTTIS: ICD-10-CM

## 2024-10-29 PROCEDURE — 99212 OFFICE O/P EST SF 10 MIN: CPT

## 2024-10-29 PROCEDURE — G2211 COMPLEX E/M VISIT ADD ON: CPT

## 2025-01-15 ENCOUNTER — APPOINTMENT (OUTPATIENT)
Dept: CT IMAGING | Facility: CLINIC | Age: 70
End: 2025-01-15
Payer: MEDICARE

## 2025-01-15 PROCEDURE — 71260 CT THORAX DX C+: CPT

## 2025-01-15 PROCEDURE — 70491 CT SOFT TISSUE NECK W/DYE: CPT

## 2025-01-27 ENCOUNTER — APPOINTMENT (OUTPATIENT)
Dept: OTOLARYNGOLOGY | Facility: CLINIC | Age: 70
End: 2025-01-27

## 2025-01-27 VITALS — HEIGHT: 73 IN | WEIGHT: 178 LBS | BODY MASS INDEX: 23.59 KG/M2

## 2025-01-27 PROCEDURE — 31575 DIAGNOSTIC LARYNGOSCOPY: CPT

## 2025-01-27 PROCEDURE — 99214 OFFICE O/P EST MOD 30 MIN: CPT | Mod: 25

## 2025-02-07 ENCOUNTER — OUTPATIENT (OUTPATIENT)
Dept: OUTPATIENT SERVICES | Facility: HOSPITAL | Age: 70
LOS: 1 days | Discharge: HOME | End: 2025-02-07

## 2025-02-07 DIAGNOSIS — Z98.890 OTHER SPECIFIED POSTPROCEDURAL STATES: Chronic | ICD-10-CM

## 2025-02-07 DIAGNOSIS — C32.0 MALIGNANT NEOPLASM OF GLOTTIS: ICD-10-CM

## 2025-02-14 ENCOUNTER — APPOINTMENT (OUTPATIENT)
Dept: HEMATOLOGY ONCOLOGY | Facility: CLINIC | Age: 70
End: 2025-02-14
Payer: MEDICARE

## 2025-02-14 ENCOUNTER — RESULT REVIEW (OUTPATIENT)
Age: 70
End: 2025-02-14

## 2025-02-14 VITALS
SYSTOLIC BLOOD PRESSURE: 160 MMHG | HEIGHT: 72 IN | BODY MASS INDEX: 24.11 KG/M2 | HEART RATE: 58 BPM | WEIGHT: 178 LBS | TEMPERATURE: 97.9 F | DIASTOLIC BLOOD PRESSURE: 82 MMHG | OXYGEN SATURATION: 97 %

## 2025-02-14 DIAGNOSIS — C32.0 MALIGNANT NEOPLASM OF GLOTTIS: ICD-10-CM

## 2025-02-14 DIAGNOSIS — C32.9 MALIGNANT NEOPLASM OF LARYNX, UNSPECIFIED: ICD-10-CM

## 2025-02-14 LAB
BASOPHILS # BLD AUTO: 0.03 K/UL — SIGNIFICANT CHANGE UP (ref 0–0.2)
BASOPHILS NFR BLD AUTO: 0.6 % — SIGNIFICANT CHANGE UP (ref 0–2)
EOSINOPHIL # BLD AUTO: 0.12 K/UL — SIGNIFICANT CHANGE UP (ref 0–0.5)
EOSINOPHIL NFR BLD AUTO: 2.5 % — SIGNIFICANT CHANGE UP (ref 0–6)
HCT VFR BLD CALC: 35.7 % — LOW (ref 39–50)
HGB BLD-MCNC: 11.7 G/DL — LOW (ref 13–17)
IMM GRANULOCYTES # BLD AUTO: 0.02 K/UL — SIGNIFICANT CHANGE UP (ref 0–0.07)
IMM GRANULOCYTES NFR BLD AUTO: 0.4 % — SIGNIFICANT CHANGE UP (ref 0–0.9)
LYMPHOCYTES # BLD AUTO: 0.56 K/UL — LOW (ref 1–3.3)
LYMPHOCYTES NFR BLD AUTO: 11.5 % — LOW (ref 13–44)
MCHC RBC-ENTMCNC: 32.1 PG — SIGNIFICANT CHANGE UP (ref 27–34)
MCHC RBC-ENTMCNC: 32.8 G/DL — SIGNIFICANT CHANGE UP (ref 32–36)
MCV RBC AUTO: 97.8 FL — SIGNIFICANT CHANGE UP (ref 80–100)
MONOCYTES # BLD AUTO: 0.5 K/UL — SIGNIFICANT CHANGE UP (ref 0–0.9)
MONOCYTES NFR BLD AUTO: 10.3 % — SIGNIFICANT CHANGE UP (ref 2–14)
NEUTROPHILS # BLD AUTO: 3.62 K/UL — SIGNIFICANT CHANGE UP (ref 1.8–7.4)
NEUTROPHILS NFR BLD AUTO: 74.7 % — SIGNIFICANT CHANGE UP (ref 43–77)
NRBC # BLD AUTO: 0 K/UL — SIGNIFICANT CHANGE UP (ref 0–0)
NRBC # FLD: 0 K/UL — SIGNIFICANT CHANGE UP (ref 0–0)
NRBC BLD AUTO-RTO: 0 /100 WBCS — SIGNIFICANT CHANGE UP (ref 0–0)
PLATELET # BLD AUTO: 241 K/UL — SIGNIFICANT CHANGE UP (ref 150–400)
PMV BLD: 9.6 FL — SIGNIFICANT CHANGE UP (ref 7–13)
RBC # BLD: 3.65 M/UL — LOW (ref 4.2–5.8)
RBC # FLD: 13 % — SIGNIFICANT CHANGE UP (ref 10.3–14.5)
WBC # BLD: 4.85 K/UL — SIGNIFICANT CHANGE UP (ref 3.8–10.5)
WBC # FLD AUTO: 4.85 K/UL — SIGNIFICANT CHANGE UP (ref 3.8–10.5)

## 2025-02-14 PROCEDURE — 99215 OFFICE O/P EST HI 40 MIN: CPT

## 2025-02-19 LAB
ALBUMIN SERPL ELPH-MCNC: 4.2 G/DL
ALP BLD-CCNC: 73 U/L
ALT SERPL-CCNC: 10 U/L
ANION GAP SERPL CALC-SCNC: 10 MMOL/L
AST SERPL-CCNC: 12 U/L
BILIRUB SERPL-MCNC: 0.4 MG/DL
BUN SERPL-MCNC: 20 MG/DL
CALCIUM SERPL-MCNC: 9.3 MG/DL
CHLORIDE SERPL-SCNC: 104 MMOL/L
CO2 SERPL-SCNC: 28 MMOL/L
CREAT SERPL-MCNC: 1.65 MG/DL
EGFR: 45 ML/MIN/1.73M2
GLUCOSE SERPL-MCNC: 76 MG/DL
POTASSIUM SERPL-SCNC: 4.3 MMOL/L
PROT SERPL-MCNC: 6.5 G/DL
SODIUM SERPL-SCNC: 142 MMOL/L
TSH SERPL-ACNC: 2.52 UIU/ML

## 2025-02-21 LAB — MAGNESIUM SERPL-MCNC: 2 MG/DL

## 2025-03-11 ENCOUNTER — APPOINTMENT (OUTPATIENT)
Dept: RADIATION ONCOLOGY | Facility: CLINIC | Age: 70
End: 2025-03-11
Payer: MEDICARE

## 2025-03-11 VITALS
SYSTOLIC BLOOD PRESSURE: 150 MMHG | HEART RATE: 63 BPM | OXYGEN SATURATION: 97 % | BODY MASS INDEX: 24.28 KG/M2 | WEIGHT: 179 LBS | DIASTOLIC BLOOD PRESSURE: 74 MMHG | RESPIRATION RATE: 16 BRPM

## 2025-03-11 DIAGNOSIS — C32.0 MALIGNANT NEOPLASM OF GLOTTIS: ICD-10-CM

## 2025-03-11 DIAGNOSIS — Z92.3 PERSONAL HISTORY OF IRRADIATION: ICD-10-CM

## 2025-03-11 PROCEDURE — 99212 OFFICE O/P EST SF 10 MIN: CPT

## 2025-04-28 ENCOUNTER — APPOINTMENT (OUTPATIENT)
Dept: OTOLARYNGOLOGY | Facility: CLINIC | Age: 70
End: 2025-04-28
Payer: MEDICARE

## 2025-04-28 VITALS
HEART RATE: 55 BPM | SYSTOLIC BLOOD PRESSURE: 177 MMHG | BODY MASS INDEX: 24.11 KG/M2 | WEIGHT: 178 LBS | OXYGEN SATURATION: 98 % | DIASTOLIC BLOOD PRESSURE: 77 MMHG | HEIGHT: 72 IN

## 2025-04-28 DIAGNOSIS — C32.9 MALIGNANT NEOPLASM OF LARYNX, UNSPECIFIED: ICD-10-CM

## 2025-04-28 DIAGNOSIS — R13.10 DYSPHAGIA, UNSPECIFIED: ICD-10-CM

## 2025-04-28 DIAGNOSIS — I89.0 LYMPHEDEMA, NOT ELSEWHERE CLASSIFIED: ICD-10-CM

## 2025-04-28 PROCEDURE — 31575 DIAGNOSTIC LARYNGOSCOPY: CPT

## 2025-04-28 PROCEDURE — 99214 OFFICE O/P EST MOD 30 MIN: CPT | Mod: 25

## 2025-07-08 ENCOUNTER — APPOINTMENT (OUTPATIENT)
Dept: CT IMAGING | Facility: CLINIC | Age: 70
End: 2025-07-08
Payer: MEDICARE

## 2025-07-08 PROCEDURE — 71260 CT THORAX DX C+: CPT

## 2025-07-08 PROCEDURE — 70491 CT SOFT TISSUE NECK W/DYE: CPT

## 2025-07-30 ENCOUNTER — APPOINTMENT (OUTPATIENT)
Dept: HEMATOLOGY ONCOLOGY | Facility: CLINIC | Age: 70
End: 2025-07-30
Payer: MEDICARE

## 2025-07-30 VITALS
HEART RATE: 56 BPM | OXYGEN SATURATION: 95 % | BODY MASS INDEX: 23.78 KG/M2 | DIASTOLIC BLOOD PRESSURE: 80 MMHG | TEMPERATURE: 98 F | SYSTOLIC BLOOD PRESSURE: 150 MMHG | HEIGHT: 72 IN | WEIGHT: 175.6 LBS

## 2025-07-30 DIAGNOSIS — C32.0 MALIGNANT NEOPLASM OF GLOTTIS: ICD-10-CM

## 2025-07-30 DIAGNOSIS — C32.9 MALIGNANT NEOPLASM OF LARYNX, UNSPECIFIED: ICD-10-CM

## 2025-07-30 PROCEDURE — 99214 OFFICE O/P EST MOD 30 MIN: CPT

## 2025-07-31 ENCOUNTER — APPOINTMENT (OUTPATIENT)
Dept: RADIATION ONCOLOGY | Facility: CLINIC | Age: 70
End: 2025-07-31

## 2025-08-21 ENCOUNTER — APPOINTMENT (OUTPATIENT)
Dept: RADIATION ONCOLOGY | Facility: CLINIC | Age: 70
End: 2025-08-21
Payer: MEDICARE

## 2025-08-21 VITALS
BODY MASS INDEX: 23.3 KG/M2 | HEIGHT: 72 IN | HEART RATE: 60 BPM | RESPIRATION RATE: 16 BRPM | WEIGHT: 172 LBS | SYSTOLIC BLOOD PRESSURE: 191 MMHG | OXYGEN SATURATION: 98 % | DIASTOLIC BLOOD PRESSURE: 77 MMHG

## 2025-08-21 DIAGNOSIS — C32.0 MALIGNANT NEOPLASM OF GLOTTIS: ICD-10-CM

## 2025-08-21 DIAGNOSIS — Z92.3 PERSONAL HISTORY OF IRRADIATION: ICD-10-CM

## 2025-08-21 PROCEDURE — 99212 OFFICE O/P EST SF 10 MIN: CPT

## 2025-08-25 ENCOUNTER — APPOINTMENT (OUTPATIENT)
Dept: OTOLARYNGOLOGY | Facility: CLINIC | Age: 70
End: 2025-08-25
Payer: MEDICARE

## 2025-08-25 DIAGNOSIS — R13.10 DYSPHAGIA, UNSPECIFIED: ICD-10-CM

## 2025-08-25 DIAGNOSIS — R91.8 OTHER NONSPECIFIC ABNORMAL FINDING OF LUNG FIELD: ICD-10-CM

## 2025-08-25 DIAGNOSIS — J90 PLEURAL EFFUSION, NOT ELSEWHERE CLASSIFIED: ICD-10-CM

## 2025-08-25 DIAGNOSIS — C32.9 MALIGNANT NEOPLASM OF LARYNX, UNSPECIFIED: ICD-10-CM

## 2025-08-25 PROCEDURE — 99214 OFFICE O/P EST MOD 30 MIN: CPT | Mod: 25

## 2025-08-25 PROCEDURE — 31575 DIAGNOSTIC LARYNGOSCOPY: CPT

## (undated) DEVICE — WARMING BLANKET UPPER ADULT

## (undated) DEVICE — TUBING SUCTION NONCONDUCTIVE 6MM X 12FT

## (undated) DEVICE — SOL ANTI FOG

## (undated) DEVICE — LABELS BLANK W PEN

## (undated) DEVICE — GLV 8 PROTEXIS (WHITE)

## (undated) DEVICE — DRAPE 3/4 SHEET 52X76"

## (undated) DEVICE — VENODYNE/SCD SLEEVE CALF MEDIUM

## (undated) DEVICE — GOWN LG

## (undated) DEVICE — Device

## (undated) DEVICE — DRSG CURITY GAUZE SPONGE 4 X 4" 12-PLY

## (undated) DEVICE — DRSG TELFA 3 X 8